# Patient Record
Sex: FEMALE | Race: WHITE | Employment: FULL TIME | URBAN - METROPOLITAN AREA
[De-identification: names, ages, dates, MRNs, and addresses within clinical notes are randomized per-mention and may not be internally consistent; named-entity substitution may affect disease eponyms.]

---

## 2017-09-21 ENCOUNTER — OFFICE VISIT (OUTPATIENT)
Dept: FAMILY MEDICINE CLINIC | Age: 67
End: 2017-09-21

## 2017-09-21 VITALS
OXYGEN SATURATION: 96 % | TEMPERATURE: 98.4 F | HEART RATE: 85 BPM | DIASTOLIC BLOOD PRESSURE: 84 MMHG | BODY MASS INDEX: 41.95 KG/M2 | HEIGHT: 70 IN | RESPIRATION RATE: 16 BRPM | SYSTOLIC BLOOD PRESSURE: 153 MMHG | WEIGHT: 293 LBS

## 2017-09-21 DIAGNOSIS — Z12.31 ENCOUNTER FOR SCREENING MAMMOGRAM FOR BREAST CANCER: Primary | ICD-10-CM

## 2017-09-21 DIAGNOSIS — Z12.11 COLON CANCER SCREENING: ICD-10-CM

## 2017-09-21 DIAGNOSIS — M79.661 RIGHT CALF PAIN: ICD-10-CM

## 2017-09-21 RX ORDER — MELOXICAM 7.5 MG/1
7.5 TABLET ORAL DAILY
Qty: 30 TAB | Refills: 0 | Status: SHIPPED | OUTPATIENT
Start: 2017-09-21 | End: 2017-10-06 | Stop reason: SDUPTHER

## 2017-09-21 NOTE — PROGRESS NOTES
Subjective:     Oniel Melo is a 74 y.o. female who presents today with the following:  Chief Complaint   Patient presents with    Leg Pain     Patient c/o R leg pain which started about 6 weeks ago. Last night patient was walking up the steps and tripped. At that time it she felt that R calf muscle \"tore\" and pain radiated into knee. Patient can hardly bear weight on that leg. Has been placing ice on affected area and took Aleve, which is helping. Patient cannot recall original injury, states it started just out of the blue. Denies swelling, redness, or warmth. Describes pain as a constant rolando horse cramp. Is wondering if she needs an MRI; called her insurance company and they told her she would be covered by insurance. ROS:  Gen: denies fever, chills, fatigue, weight loss, weight gain  HEENT:denies blurry vision, nasal congestion, sore throat  Resp: denies dypsnea, cough, wheezing  CV: denies chest pain, palpitations, lower extremity edema  Abd: denies nausea, vomiting, diarrhea, constipation  Neuro: denies numbness/tingling  Endo: denies polyuria, polydipsia, heat/cold intolerance  Heme: no lymphadenopathy    No Known Allergies      Current Outpatient Prescriptions:     meloxicam (MOBIC) 7.5 mg tablet, Take 1 Tab by mouth daily. , Disp: 30 Tab, Rfl: 0    clobetasol (TEMOVATE) 0.05 % topical cream, Apply  to affected area two (2) times a day. hand, Disp: 15 g, Rfl: 1    PV W-O ALBERTO/FERROUS FUMARATE/FA (M-VIT PO), Take  by mouth., Disp: , Rfl:     Past Medical History:   Diagnosis Date    Arthritis     Eczema     Hyperlipidemia     Hypothyroid        Past Surgical History:   Procedure Laterality Date    HX GYN      tubal pregnancy    HX ORTHOPAEDIC  1998    bone spur       History   Smoking Status    Current Every Day Smoker    Packs/day: 1.00    Years: 30.00    Types: Cigarettes   Smokeless Tobacco    Never Used       Social History     Social History    Marital status:  Spouse name: N/A    Number of children: N/A    Years of education: N/A     Social History Main Topics    Smoking status: Current Every Day Smoker     Packs/day: 1.00     Years: 30.00     Types: Cigarettes    Smokeless tobacco: Never Used    Alcohol use No    Drug use: No    Sexual activity: Yes     Partners: Male     Other Topics Concern    None     Social History Narrative       History reviewed. No pertinent family history. Objective:     Visit Vitals    /84 (BP 1 Location: Left arm, BP Patient Position: Sitting)    Pulse 85    Temp 98.4 °F (36.9 °C) (Oral)    Resp 16    Ht 5' 10\" (1.778 m)    Wt 320 lb (145.2 kg)    SpO2 96%    BMI 45.92 kg/m2     Gen: alert, oriented, no acute distress  Head: normocephalic, atraumatic  Eyes:sclera clear, conjunctiva clear  Oral: moist mucus membranes  Resp: Normal work of breathing, lungs CTAB, no w/r/r  CV: S1, S2 normal.  No murmurs, rubs, or gallops. Extremities: Bilateral legs with moderate amount of swelling in knees. R knee non tender to palpation. R calf with negative homans sign. Normal strength LE. Normal gait. No results found for this visit on 09/21/17. Assessment/ Plan:   Diagnoses and all orders for this visit:    1. Encounter for screening mammogram for breast cancer  -     [facility] MAMMO BI SCREENING INCL CAD; Future    2. Colon cancer screening  -     OCCULT BLOOD, IMMUNOASSAY (FIT)    3. Right calf pain  -     meloxicam (MOBIC) 7.5 mg tablet; Take 1 Tab by mouth daily. Exam WNL today; likely muscular strain. Has some swelling in knees, this is likely chronic given bilateral nature. PT agreed to start with ice and NSAIDs. Will call if symptoms persist and she wants to pursue imaging of her knee. Of note, patient also requested an rx of Augmentin \"just to have\". States she takes \"1 pill at a time\" when she has a sore throat and that helps keep her from developing bronchitis.   I explained that this is not how antibiotics are taken and her method could help promote antibiotic resistance. I declined to fill Rx. Verbal and written instructions (see AVS) provided.  Patient expresses understanding of diagnosis and treatment plan. Sandeep Dose.  Denver Sarmiento MD

## 2017-09-21 NOTE — PROGRESS NOTES
Chief Complaint   Patient presents with    Leg Pain     Patient states she pulled muscle in leg yesterday. Patient declined Influenza Vaccine today.

## 2017-10-05 LAB — HEMOCCULT STL QL IA: NEGATIVE

## 2017-10-06 ENCOUNTER — DOCUMENTATION ONLY (OUTPATIENT)
Dept: INTERNAL MEDICINE CLINIC | Age: 67
End: 2017-10-06

## 2017-10-06 DIAGNOSIS — M79.661 RIGHT CALF PAIN: ICD-10-CM

## 2017-10-06 RX ORDER — MELOXICAM 7.5 MG/1
7.5 TABLET ORAL DAILY
Qty: 30 TAB | Refills: 0 | Status: SHIPPED | OUTPATIENT
Start: 2017-10-06 | End: 2018-09-11 | Stop reason: ALTCHOICE

## 2017-10-06 NOTE — PROGRESS NOTES
Patient called requesting refill of mobic. Dr. Karyle Barba called prescription today. Patient states is not able to get it until 10/14. Patient stated she doubled the medication because 1 tab did not work for her. Advised patient that I could  Not call her pharmacy to increase her dosage as  she ran out of medication because took 2tbs daily instead of 1 as prescribed.

## 2018-09-11 ENCOUNTER — OFFICE VISIT (OUTPATIENT)
Dept: FAMILY MEDICINE CLINIC | Age: 68
End: 2018-09-11

## 2018-09-11 VITALS
SYSTOLIC BLOOD PRESSURE: 150 MMHG | HEART RATE: 77 BPM | TEMPERATURE: 97.6 F | BODY MASS INDEX: 43.4 KG/M2 | DIASTOLIC BLOOD PRESSURE: 86 MMHG | WEIGHT: 293 LBS | OXYGEN SATURATION: 96 % | HEIGHT: 69 IN | RESPIRATION RATE: 20 BRPM

## 2018-09-11 DIAGNOSIS — L30.9 PERIORBITAL DERMATITIS: ICD-10-CM

## 2018-09-11 DIAGNOSIS — L30.9 HAND ECZEMA: ICD-10-CM

## 2018-09-11 DIAGNOSIS — H02.846 SWELLING OF LEFT EYELID: Primary | ICD-10-CM

## 2018-09-11 RX ORDER — DICLOFENAC POTASSIUM 50 MG/1
50 TABLET, FILM COATED ORAL 3 TIMES DAILY
COMMUNITY
End: 2021-11-22 | Stop reason: SDUPTHER

## 2018-09-11 RX ORDER — AMOXICILLIN AND CLAVULANATE POTASSIUM 875; 125 MG/1; MG/1
1 TABLET, FILM COATED ORAL 2 TIMES DAILY
Qty: 20 TAB | Refills: 1 | Status: SHIPPED | OUTPATIENT
Start: 2018-09-11 | End: 2018-09-21

## 2018-09-11 RX ORDER — ERYTHROMYCIN 5 MG/G
OINTMENT OPHTHALMIC
Qty: 3.5 G | Refills: 0 | Status: SHIPPED | OUTPATIENT
Start: 2018-09-11 | End: 2020-07-10

## 2018-09-11 RX ORDER — CLOBETASOL PROPIONATE 0.5 MG/G
OINTMENT TOPICAL 2 TIMES DAILY
Qty: 15 G | Refills: 0 | Status: SHIPPED | OUTPATIENT
Start: 2018-09-11 | End: 2018-12-19 | Stop reason: SDUPTHER

## 2018-09-11 NOTE — PROGRESS NOTES
Chief Complaint Patient presents with  Eye Swelling  
  left 1. Have you been to the ER, urgent care clinic since your last visit? Hospitalized since your last visit? No 
 
2. Have you seen or consulted any other health care providers outside of the [address] [place] [address] since your last visit? Include any pap smears or colon screening. No  
 
Pt reports that the above symptoms started around a 1.5 weeks ago. Pt reports that she put Vaseline on her eye lid, and then the next day it was swollen. Pt declined flu shot today.

## 2018-09-11 NOTE — MR AVS SNAPSHOT
[facility] 
 
 
 [address] [place] [address] 
[phone] Patient: Benedict Patel MRN: [id number] :1950 Visit Information Date & Time Provider Department Dept. Phone Encounter #  
 2018  7:45 AM Sapphire Sherman NP [address] [place] [address] [phone] [id number] Upcoming Health Maintenance Date Due Hepatitis C Screening 1950 BREAST CANCER SCRN MAMMOGRAM 2002 ZOSTER VACCINE AGE 60> 2012 GLAUCOMA SCREENING Q2Y 2017 Bone Densitometry (Dexa) Screening 2017 Influenza Age 5 to Adult 2018 FOBT Q 1 YEAR AGE 50-75 2018 Pneumococcal 65+ Low/Medium Risk (2 of 2 - PPSV23) 2018 DTaP/Tdap/Td series (2 - Td) 2024 Allergies as of 2018  Review Complete On: 2018 By: Sapphire Sherman NP No Known Allergies Current Immunizations  Never Reviewed Name Date Tdap 2014 11:46 AM  
  
 Not reviewed this visit You Were Diagnosed With   
  
 Codes Comments Swelling of left eyelid    -  Primary ICD-10-CM: U60.092 ICD-9-CM: 374.82 Periorbital dermatitis     ICD-10-CM: L30.9 ICD-9-CM: 692.9 Hand eczema     ICD-10-CM: L30.9 ICD-9-CM: 692.9 Vitals BP Pulse Temp Resp Height(growth percentile) Weight(growth percentile) 150/86 (BP 1 Location: Left arm, BP Patient Position: Sitting) 77 97.6 °F (36.4 °C) (Oral) 20 5' 9\" (1.753 m) 332 lb 12.8 oz (151 kg) SpO2 BMI OB Status Smoking Status 96% 49.15 kg/m2 Postmenopausal Current Every Day Smoker Vitals History BMI and BSA Data Body Mass Index Body Surface Area  
 49.15 kg/m 2 2.71 m 2 Preferred Pharmacy [facility] [phone] Your Updated Medication List  
  
   
This list is accurate as of 18  9:26 AM.  Always use your most recent med list.  
  
  
  
  
 amoxicillin-clavulanate 875-125 mg per tablet Commonly known as:  AUGMENTIN Take 1 Tab by mouth two (2) times a day for 10 days. clobetasol 0.05 % ointment Commonly known as:  Becky Mood Apply  to affected area two (2) times a day. diclofenac potassium 50 mg tablet Commonly known as:  CATAFLAM  
Take 50 mg by mouth three (3) times daily. erythromycin ophthalmic ointment Commonly known as:  ILOTYCIN Apply to eyelid and under eye 2 times per day Prescriptions Sent to Pharmacy Refills  
 erythromycin (ILOTYCIN) ophthalmic ointment 0 Sig: Apply to eyelid and under eye 2 times per day Class: Normal  
 Pharmacy: [address] [place] [address] Ph #: [phone]  
 amoxicillin-clavulanate (AUGMENTIN) 875-125 mg per tablet 1 Sig: Take 1 Tab by mouth two (2) times a day for 10 days. Class: Normal  
 Pharmacy: [address] Ph #: [phone] Route: Oral  
 clobetasol (TEMOVATE) 0.05 % ointment 0 Sig: Apply  to affected area two (2) times a day. Class: Normal  
 Pharmacy: [address] Ph #: [phone] Route: Topical  
  
Patient Instructions Atopic Dermatitis: Care Instructions Your Care Instructions Atopic dermatitis (also called eczema) is a skin problem that causes intense itching and a red, raised rash. In severe cases, the rash develops clear fluid-filled blisters. The rash is not contagious. People with this condition seem to have very sensitive immune systems that are likely to react to things that cause allergies. The immune system is the body's way of fighting infection. There is no cure for atopic dermatitis, but you may be able to control it with care at home. Follow-up care is a key part of your treatment and safety.  Be sure to make and go to all appointments, and call your doctor if you are having problems. It's also a good idea to know your test results and keep a list of the medicines you take. How can you care for yourself at home? · Use moisturizer at least twice a day. · If your doctor prescribes a cream, use it as directed. If your doctor prescribes other medicine, take it exactly as directed. · Wash the affected area with water only. Soap can make dryness and itching worse. Pat dry. · Apply a moisturizer after bathing. Use a cream such as Lubriderm, Moisturel, or Cetaphil that does not irritate the skin or cause a rash. Apply the cream while your skin is still damp after lightly drying with a towel. · Use cold, wet cloths to reduce itching. · Keep cool, and stay out of the sun. · If itching affects your normal activities, an over-the-counter antihistamine, such as diphenhydramine (Benadryl) or loratadine (Claritin) may help. Read and follow all instructions on the label. When should you call for help? Call your doctor now or seek immediate medical care if: 
  · Your rash gets worse and you have a fever.  
  · You have new blisters or bruises, or the rash spreads and looks like a sunburn.  
  · You have signs of infection, such as: 
¨ Increased pain, swelling, warmth, or redness. ¨ Red streaks leading from the rash. ¨ Pus draining from the rash. ¨ A fever.  
  · You have crusting or oozing sores.  
  · You have joint aches or body aches along with your rash.  
 Watch closely for changes in your health, and be sure to contact your doctor if: 
  · Your rash does not clear up after 2 to 3 weeks of home treatment.  
  · Itching interferes with your sleep or daily activities. Where can you learn more? Go to http://ladan-jarrod.info/. Enter Z012 in the search box to learn more about \"Atopic Dermatitis: Care Instructions. \" Current as of: October 5, 2017 Content Version: 11.7 © 7279-2371 Healthwise, Incorporated. Care instructions adapted under license by [employer] (which disclaims liability or warranty for this information). If you have questions about a medical condition or this instruction, always ask your healthcare professional. [address] any warranty or liability for your use of this information. Introducing [facility]! [employer] introduces Fly[employer]ivity patient portal. Now you can access parts of your medical record, email your doctor's office, and request medication refills online. 1. In your internet browser, go to [email or web address]. [employer] 2. Click on the First Time User? Click Here link in the Sign In box. You will see the New Member Sign Up page. 3. Enter your Flytivity Access Code exactly as it appears below. You will not need to use this code after youve completed the sign-up process. If you do not sign up before the expiration date, you must request a new code. · Fly[employer] Access Code: [email or web address] Expires: 12/10/2018  9:26 AM 
 
4. Enter the last four digits of your Social Security Number (xxxx) and Date of Birth (mm/dd/yyyy) as indicated and click Submit. You will be taken to the next sign-up page. 5. Create a Flytivity ID. This will be your Flytivity login ID and cannot be changed, so think of one that is secure and easy to remember. 6. Create a [employer]ivity password. You can change your password at any time. 7. Enter your Password Reset Question and Answer. This can be used at a later time if you forget your password. 8. Enter your e-mail address. You will receive e-mail notification when new information is available in [address] 9. Click Sign Up. You can now view and download portions of your medical record. 10. Click the Download Summary menu link to download a portable copy of your medical information.  
 
If you have questions, please visit the Frequently Asked Questions section of the [email or web address]. Remember, [facility] is NOT to be used for urgent needs. For medical emergencies, dial 911. Now available from your iPhone and Android! Please provide this summary of care documentation to your next provider. Your primary care clinician is listed as Anu Philip. If you have any questions after today's visit, please call [phone].

## 2018-09-11 NOTE — PATIENT INSTRUCTIONS

## 2018-09-11 NOTE — PROGRESS NOTES
[facility] is a 79 y.o. female  and presents with Chief Complaint Patient presents with  Eye Swelling  
  left Left eye swelling for about a week and half. She denies any insect bite, trauma, foreign body, exposures to any chemicals, changes in her environment, new food, no makeup,soaps or lotions (she does not use makeup). She does not recall rubbing her eye. She says that she started with \"dry patch to upper left eye lid\", put vaseline on it and then next day when she woke up the whole left eye lid and eye was swollen and has progressively worsened and now has redness to the skin below the eye as well. She says that yesterday the eye appeared worse than today. She has tried using eye drops \"moisture drop\" to flush the eye. She denies any purulent drainage. She denies any itching. She denies any pain. She denies any change in her vision. She does not wear contacts. She does not have sensation of FB. She does have a history of eczema, usually only affects her right palm of her hand. Past Medical History:  
Diagnosis Date  Arthritis  Eczema  Hyperlipidemia  Hypothyroid Past Surgical History:  
Procedure Laterality Date  HX GYN    
 tubal pregnancy  HX ORTHOPAEDIC  1998  
 bone spur Social History Social History  Marital status:  Spouse name: N/A  
 Number of children: N/A  
 Years of education: N/A Occupational History  Not on file. Social History Main Topics  Smoking status: Current Every Day Smoker Packs/day: 1.00 Years: 30.00 Types: Cigarettes  Smokeless tobacco: Never Used  Alcohol use No  
 Drug use: No  
 Sexual activity: Yes  
  Partners: Male Other Topics Concern  Not on file Social History Narrative No Known Allergies ROS: 
Gen: no fatigue, fever, chills Eyes: no excessive tearing, itching, or discharge Nose: no rhinorrhea, no sinus pain Mouth: no oral lesions, no sore throat Resp: no shortness of breath, no wheezing, no cough CV: no chest pain, no paroxysmal nocturnal dyspnea Abd: no nausea, no heartburn, no diarrhea, no constipation, no abdominal pain Neuro: no headaches, no syncope or presyncopal episodes Heme: no lymphadenopathy, no easy bruising or bleeding Physical Examination:   
Physical Exam 
Visit Vitals  /86 (BP 1 Location: Left arm, BP Patient Position: Sitting)  Pulse 77  Temp 97.6 °F (36.4 °C) (Oral)  Resp 20  
 Ht 5' 9\" (1.753 m)  Wt 332 lb 12.8 oz (151 kg)  SpO2 96%  BMI 49.15 kg/m2 Gen: alert, oriented, no acute distress Head: normocephalic, atraumatic Ears: external auditory canals clear, TMs without erythema or effusion Eyes: RIGHT EYE: pupils equal round reactive to light, sclera clear, conjunctiva clear. No swelling or periorbital erythema. LEFT eye: Pupils are equal round reactive to light, sclera clear, conjunctive are clear, no signs of foreign body to the upper or lower lid, no sign of trauma, no unusual drainage or discharge. The left upper lid and skin around the entire anna-orbital area appears dry, flaky, erythematous and the upper lid appears slightly edematous. There are no signs of open wounds or insect bite. Visual acuity is intact Nose: normal turbinates, no rhinorrhea Resp: no increased work of breathing Neuro: cranial nerves intact, normal strength and movement in all extremities Skin: The palm of right hand has a erythematous scaly dermatitis. Assessment/Plan: 
Differential diagnosis and treatment options reviewed with patient who is in agreement with treatment plan as outlined below. ICD-10-CM ICD-9-CM 1. Swelling of left eyelid H02.846 374.82 erythromycin (ILOTYCIN) ophthalmic ointment 2. Periorbital dermatitis L30.9 692.9 erythromycin (ILOTYCIN) ophthalmic ointment 3. Hand eczema L30.9 692.9 clobetasol (TEMOVATE) 0.05 % ointment Questionable infection versus allergic dermatitis versus contact dermatitis versus eczema flare. Will try treating the eye area with erythromycin eye ointment, she will call to let me know if there is no improvement or any worsening of her symptoms. She also requests a refill on Augmentin, states that Dr. Helen Krause has given her standing order for Augmentin in the past.  May not be a bad idea for her to start the Augmentin if her symptoms worsen. She is overdue for a wellness physical, I recommend that she schedule follow-up for routine labs and wellness appointment. She declines flu shot at this time. She declines health maintenance screening such as mammogram and DEXA bone density screening. She will be due for colon cancer screening FIT test in a few weeks as well She is overdue for eye exam, she has had Lasix surgery in the past.  May not be a bad idea for her to visit her eye doctor for routine glaucoma screening and eye exam and let them evaluate her eye as well. Microphone off Her blood pressure is elevated today. I encouraged her to follow a DASH diet at home, and blood pressure monitoring at home as well. I recommend that she follow-up in 2-3 weeks for a blood pressure recheck. Discussed BMI and healthy weight. Encouraged patient to work to implement changes including diet high in raw fruits and vegetables, lean protein and good fats. Limit refined, processed carbohydrates and sugar. Encouraged regular exercise. Verbal and written instructions (see AVS) provided. Patient expresses understanding and agreement of diagnosis and treatment plan.

## 2018-12-19 DIAGNOSIS — L30.9 HAND ECZEMA: ICD-10-CM

## 2018-12-20 NOTE — TELEPHONE ENCOUNTER
I do not understand this request.  Please call patient and get more information (requesting refill on Augmentin?   When was this prescribed?)

## 2018-12-27 NOTE — TELEPHONE ENCOUNTER
Called pt and verified name and . Pt verbalized that she wanted a rx for the chronic cough she has. Informed her that she hasn't been seen since September and that she would need to be seen and evaluated to have something prescribed for her. Pt verbalized that she is unable to make appointment to come into office to be seen due to her work schedule and she can not get anymore time off. Advised pt that she can go to  or [facility] express that is open after 5 PM to see if she can be evaluated. Pt verbalized understanding. Pt would still like a refill on cream. Please advise.

## 2019-01-02 RX ORDER — CLOBETASOL PROPIONATE 0.5 MG/G
OINTMENT TOPICAL 2 TIMES DAILY
Qty: 15 G | Refills: 0 | Status: SHIPPED | OUTPATIENT
Start: 2019-01-02 | End: 2019-05-03 | Stop reason: SDUPTHER

## 2019-01-02 RX ORDER — AMOXICILLIN AND CLAVULANATE POTASSIUM 875; 125 MG/1; MG/1
TABLET, FILM COATED ORAL
Qty: 20 TAB | Refills: 1 | OUTPATIENT
Start: 2019-01-02

## 2019-05-03 DIAGNOSIS — L30.9 HAND ECZEMA: ICD-10-CM

## 2019-05-03 RX ORDER — CLOBETASOL PROPIONATE 0.5 MG/G
OINTMENT TOPICAL 2 TIMES DAILY
Qty: 15 G | Refills: 0 | Status: SHIPPED | OUTPATIENT
Start: 2019-05-03 | End: 2019-05-16 | Stop reason: SDUPTHER

## 2019-05-16 ENCOUNTER — OFFICE VISIT (OUTPATIENT)
Dept: FAMILY MEDICINE CLINIC | Age: 69
End: 2019-05-16

## 2019-05-16 VITALS
TEMPERATURE: 97.9 F | BODY MASS INDEX: 43.4 KG/M2 | WEIGHT: 293 LBS | SYSTOLIC BLOOD PRESSURE: 151 MMHG | HEART RATE: 79 BPM | HEIGHT: 69 IN | DIASTOLIC BLOOD PRESSURE: 99 MMHG | OXYGEN SATURATION: 95 % | RESPIRATION RATE: 14 BRPM

## 2019-05-16 DIAGNOSIS — L98.491 SKIN ULCER, LIMITED TO BREAKDOWN OF SKIN (HCC): ICD-10-CM

## 2019-05-16 DIAGNOSIS — E55.9 VITAMIN D DEFICIENCY: ICD-10-CM

## 2019-05-16 DIAGNOSIS — E03.9 ACQUIRED HYPOTHYROIDISM: ICD-10-CM

## 2019-05-16 DIAGNOSIS — E78.2 MIXED HYPERLIPIDEMIA: ICD-10-CM

## 2019-05-16 DIAGNOSIS — Z11.59 ENCOUNTER FOR HEPATITIS C SCREENING TEST FOR LOW RISK PATIENT: ICD-10-CM

## 2019-05-16 DIAGNOSIS — N39.3 STRESS INCONTINENCE OF URINE: ICD-10-CM

## 2019-05-16 DIAGNOSIS — Z12.11 SPECIAL SCREENING FOR MALIGNANT NEOPLASMS, COLON: ICD-10-CM

## 2019-05-16 DIAGNOSIS — E66.01 OBESITY, CLASS III, BMI 40-49.9 (MORBID OBESITY) (HCC): ICD-10-CM

## 2019-05-16 DIAGNOSIS — E66.01 CLASS 3 SEVERE OBESITY WITH BODY MASS INDEX (BMI) OF 50.0 TO 59.9 IN ADULT, UNSPECIFIED OBESITY TYPE, UNSPECIFIED WHETHER SERIOUS COMORBIDITY PRESENT (HCC): ICD-10-CM

## 2019-05-16 DIAGNOSIS — R60.0 LOWER EXTREMITY EDEMA: ICD-10-CM

## 2019-05-16 DIAGNOSIS — L30.9 ECZEMA, UNSPECIFIED TYPE: Primary | ICD-10-CM

## 2019-05-16 DIAGNOSIS — L30.9 HAND ECZEMA: ICD-10-CM

## 2019-05-16 DIAGNOSIS — I10 ESSENTIAL HYPERTENSION: ICD-10-CM

## 2019-05-16 RX ORDER — HYDROCHLOROTHIAZIDE 12.5 MG/1
12.5 TABLET ORAL DAILY
Qty: 30 TAB | Refills: 1 | Status: SHIPPED | OUTPATIENT
Start: 2019-05-16 | End: 2019-06-17 | Stop reason: SDUPTHER

## 2019-05-16 RX ORDER — CLOBETASOL PROPIONATE 0.5 MG/G
OINTMENT TOPICAL 2 TIMES DAILY
Qty: 60 G | Refills: 3 | Status: SHIPPED | OUTPATIENT
Start: 2019-05-16

## 2019-05-16 RX ORDER — MUPIROCIN 20 MG/G
OINTMENT TOPICAL 2 TIMES DAILY
Qty: 22 G | Refills: 0 | Status: SHIPPED | OUTPATIENT
Start: 2019-05-16 | End: 2019-07-22

## 2019-05-16 RX ORDER — AMOXICILLIN AND CLAVULANATE POTASSIUM 875; 125 MG/1; MG/1
1 TABLET, FILM COATED ORAL 2 TIMES DAILY
Qty: 20 TAB | Refills: 0 | Status: SHIPPED | OUTPATIENT
Start: 2019-05-16 | End: 2019-05-26

## 2019-05-16 NOTE — PROGRESS NOTES
Subjective: Chief Complaint Patient presents with  
 Skin Problem  
  eczema HPI: 
Ricardo Poe is a 81 y.o. female presents for follow up appointment. Today she is here for complaints of eczema flaring up about a month ago. Has a small open wound to right lower leg. Has been applying vaseline and/or clobetasol ointment which helps with itching but is not clearing it up. She has not been applying antibiotic ointment to the open wound. Has not ever been to dermatology for this complaint. BP elevated today but says she is stressed from her job. She says that she checks her BP at home and is always good. She has some swelling in her lower legs, thinks is related to her eczema flaring up. Has history of hypothyroidism, says she has been off medication for at least 4 years \"because I dont think it was doing anything\". Has not had labs done in very long time. Denies polyuria or polydipsia. She admits that she has stress incontinence. She is a smoker, about half pack per day. She does not want to quit. No hospital, ER or specialist visits since last primary care visit except as noted above. Past Medical History:  
Diagnosis Date  Arthritis  Eczema  Hyperlipidemia  Hypothyroid Social History Tobacco Use  Smoking status: Current Every Day Smoker Packs/day: 1.00 Years: 30.00 Pack years: 30.00 Types: Cigarettes  Smokeless tobacco: Never Used Substance Use Topics  Alcohol use: No  
  Alcohol/week: 0.0 oz  Drug use: No  
 
 
Outpatient Medications Marked as Taking for the 5/16/19 encounter (Office Visit) with Reema Lopez, DOUG Medication Sig Dispense Refill  clobetasol (TEMOVATE) 0.05 % ointment APPLY TO AFFECTED AREA TWO (2) TIMES A DAY. 15 g 0  
 diclofenac potassium (CATAFLAM) 50 mg tablet Take 50 mg by mouth three (3) times daily.     
 erythromycin (ILOTYCIN) ophthalmic ointment Apply to eyelid and under eye 2 times per day 3.5 g 0 No Known Allergies Health Maintenance reviewed ROS: 
Gen: no fatigue, no fever, no chills, no unexplained weight loss or weight gain Eyes: no excessive tearing, itching, or discharge Nose: no rhinorrhea, no sinus pain Mouth: no oral lesions, no sore throat, no difficulty swallowing Resp: no shortness of breath, no wheezing, no cough CV: no chest pain, no orthopnea, no paroxysmal nocturnal dyspnea, no palpitations Abd: no nausea, no heartburn, no diarrhea, no constipation, no abdominal pain Neuro: no headaches, no syncope or presyncopal episodes Endo: no polyuria, no polydipsia. : no hematuria, no dysuria, no frequency, no incontinence Heme: no lymphadenopathy, no easy bruising or bleeding, no night sweats PE: 
Visit Vitals BP (!) 151/99 (BP 1 Location: Left arm, BP Patient Position: Sitting) Pulse 79 Temp 97.9 °F (36.6 °C) (Oral) Resp 14 Ht 5' 9\" (1.753 m) Wt 346 lb (156.9 kg) SpO2 95% BMI 51.10 kg/m² Gen: alert, oriented, no acute distress Head: normocephalic, atraumatic Ears: external auditory canals clear, TMs without erythema or effusion Eyes: pupils equal round reactive to light, sclera clear, conjunctiva clear Oral: moist mucus membranes, no oral lesions, no pharyngeal inflammation or exudate Neck: symmetric normal sized thyroid, no carotid bruits, no jugular vein distention Resp: no increase work of breathing, lungs clear to ausculation bilaterally, no wheezing, rales or rhonchi CV: S1, S2 normal.  No murmurs, rubs, or gallops. Abd: soft, not tender, not distended. No hepatosplenomegaly. Normal bowel sounds. No abdominal or renal bruits. Neuro: cranial nerves intact, normal strength and movement in all extremities, reflexes and sensation intact and symmetric. Skin: dime size dermal ulcer to her lower right shin with some mild surrounding erythema.   She has scattered erythematous, dry/scaly patches to lower extremities. Extremities: no cyanosis. Trace bilateral lower extremities. Assessment/Plan: 
Differential diagnosis and treatment options reviewed with patient who is in agreement with treatment plan as outlined below. ICD-10-CM ICD-9-CM 1. Eczema, unspecified type L30.9 692.9 CBC WITH AUTOMATED DIFF 2. Acquired hypothyroidism E03.9 244.9 TSH 3RD GENERATION  
   T3 TOTAL T4, FREE 3. Mixed hyperlipidemia L67.1 731.7 METABOLIC PANEL, COMPREHENSIVE  
   LIPID PANEL  
4. Obesity, Class III, BMI 40-49.9 (morbid obesity) ([facility]) E66.01 278.01   
5. Class 3 severe obesity with body mass index (BMI) of 50.0 to 59.9 in adult, unspecified obesity type, unspecified whether serious comorbidity present ([facility] E66.01 278.01   
 Z68.43 V85.43   
6. Vitamin D deficiency E55.9 268.9 VITAMIN D, 25 HYDROXY 7. Encounter for hepatitis C screening test for low risk patient Z11.59 V73.89 HEPATITIS C AB  
8. Hand eczema L30.9 692.9 clobetasol (TEMOVATE) 0.05 % ointment 9. Skin ulcer, limited to breakdown of skin ([facility]) L98.491 707.9 mupirocin (BACTROBAN) 2 % ointment 10. Essential hypertension I10 401.9 hydroCHLOROthiazide (HYDRODIURIL) 12.5 mg tablet 11. Lower extremity edema R60.0 782.3 hydroCHLOROthiazide (HYDRODIURIL) 12.5 mg tablet 12. Stress incontinence of urine N39.3 [id number] 13. Special screening for malignant neoplasms, colon Z12.11 V76.51 OCCULT BLOOD IMMUNOASSAY,DIAGNOSTIC  
 
BP elevated and has lower extremity edema. Will start on HCTZ. Medication profile discussed with patient. DASH diet discussed and encouraged. Stress incontinence: Will trial kegel exercises and bladder training. Advised to go to BR Q2 hours. Discussed BMI and healthy weight. Encouraged patient to work to implement changes including diet high in raw fruits and vegetables, lean protein and good fats. Limit refined, processed carbohydrates and sugar.  Encouraged regular exercise. Recommended regular cardiovascular exercise 3-6 times per week for 30-60 minutes daily. Labs today. Will see if thyroid lab is abnormal which could be contributing to her eczema flare Consider refer to derm if no improvement Treat dermal ulcer with topical Bactroban. She will call if no improvement or worsening of wound. Follow-up in 2 to 4 weeks for blood pressure recheck. I have discussed the diagnosis with the patient and the intended plan as seen in the above orders. The patient has received an after-visit summary and questions were answered concerning future plans. I have discussed medication side effects and warnings with the patient as well. The patient verbalizes understanding and agreement with the plan.

## 2019-05-16 NOTE — PROGRESS NOTES
. 
Chief Complaint Patient presents with  
 Skin Problem  
  eczema Zachery Vega 1. Have you been to the ER, urgent care clinic since your last visit? Hospitalized since your last visit? no 
 
2. Have you seen or consulted any other health care providers outside of the [address] [place] [address] since your last visit? Include any pap smears or colon screening. No 
. Health Maintenance Due Topic Date Due  
 Hepatitis C Screening  1950  Shingrix Vaccine Age 50> (1 of 2) 04/08/2002  BREAST CANCER SCRN MAMMOGRAM  04/08/2002  GLAUCOMA SCREENING Q2Y  04/08/2017  Bone Densitometry (Dexa) Screening  04/08/2017  Pneumococcal 65+ years (1 of 2 - PCV13) 04/08/2017  
 FOBT Q 1 YEAR AGE 50-75  09/27/2018 Silverio Leos Sebastian Spatz

## 2019-05-16 NOTE — PATIENT INSTRUCTIONS
Kegel Exercises: Care Instructions Your Care Instructions Kegel exercises strengthen muscles around the bladder. These muscles control the flow of urine. Kegel exercises are sometime called \"pelvic floor\" exercises. They can help prevent urine leakage and keep the pelvic organs in place. A woman who just had a baby might want to try Kegel exercises. They can strengthen pelvic muscles that have been weakened by pregnancy and childbirth. A man or woman may use Kegel exercises to treat urine leakage. You do Kegel exercises by tightening the muscles you use when you urinate. You will likely need to do these exercises for several weeks to get better. Follow-up care is a key part of your treatment and safety. Be sure to make and go to all appointments, and call your doctor if you are having problems. It's also a good idea to know your test results and keep a list of the medicines you take. How can you care for yourself at home? · Do Kegel exercises. ? Find the muscles you need to strengthen. To do this, tighten the muscles that stop your urine while you are going to the bathroom. These are the same muscles you squeeze during Kegel exercises. ? Squeeze the muscles as hard as you can. Your belly and thighs should not move. ? Hold the squeeze for 3 seconds. Then relax for 3 seconds. ? Start with 3 seconds, and then add 1 second each week until you are able to squeeze for 10 seconds. ? Repeat the exercise 10 to 15 times for each session. Do three or more sessions each day. · You can check to see if you are using the right muscles. Place a finger in your vagina and squeeze around it. You are doing them right when you feel pressure around your finger. Your doctor may also suggest that you put special weights in your vagina while you do the exercises. · Do not smoke. It can irritate the bladder. If you need help quitting, talk to your doctor about stop-smoking programs and medicines.  These can increase your chances of quitting for good. Where can you learn more? Go to http://ladan-jarrod.info/. Enter Q082 in the search box to learn more about \"Kegel Exercises: Care Instructions. \" Current as of: March 20, 2018 Content Version: 11.9 © 3686-3012 [employer]. Care instructions adapted under license by [employer] (which disclaims liability or warranty for this information). If you have questions about a medical condition or this instruction, always ask your healthcare professional. [address] any warranty or liability for your use of this information. Bladder Training: Care Instructions Your Care Instructions Bladder training is used to treat urge incontinence and stress incontinence. Urge incontinence means that the need to urinate comes on so fast that you can't get to a toilet in time. Stress incontinence means that you leak urine because of pressure on your bladder. For example, it may happen when you laugh, cough, or lift something heavy. Bladder training can increase how long you can wait before you have to urinate. It can also help your bladder hold more urine. And it can give you better control over the urge to urinate. It is important to remember that bladder training takes a few weeks to a few months to make a difference. You may not see results right away, but don't give up. Follow-up care is a key part of your treatment and safety. Be sure to make and go to all appointments, and call your doctor if you are having problems. It's also a good idea to know your test results and keep a list of the medicines you take. How can you care for yourself at home? Work with your doctor to come up with a bladder training program that is right for you. You may use one or more of the following methods. Delayed urination · In the beginning, try to keep from urinating for 5 minutes after you first feel the need to go. · While you wait, take deep, slow breaths to relax. Kegel exercises can also help you delay the need to go to the bathroom. · After some practice, when you can easily wait 5 minutes to urinate, try to wait 10 minutes before you urinate. · Slowly increase the waiting period until you are able to control when you have to urinate. Scheduled urination · Empty your bladder when you first wake up in the morning. · Schedule times throughout the day when you will urinate. · Start by going to the bathroom every hour, even if you don't need to go. · Slowly increase the time between trips to the bathroom. · When you have found a schedule that works well for you, keep doing it. · If you wake up during the night and have to urinate, do it. Apply your schedule to waking hours only. Kegel exercises These tighten and strengthen pelvic muscles, which can help you control the flow of urine. To do Kegel exercises: 
· Squeeze the same muscles you would use to stop your urine. Your belly and thighs should not move. · Hold the squeeze for 3 seconds, and then relax for 3 seconds. · Start with 3 seconds. Then add 1 second each week until you are able to squeeze for 10 seconds. · Repeat the exercise 10 to 15 times a session. Do three or more sessions a day. When should you call for help? Watch closely for changes in your health, and be sure to contact your doctor if: 
  · Your incontinence is getting worse.  
  · You do not get better as expected. Where can you learn more? Go to http://ladan-jarrod.info/. Enter L752 in the search box to learn more about \"Bladder Training: Care Instructions. \" Current as of: March 20, 2018 Content Version: 11.9 © 6482-1389 [employer]. Care instructions adapted under license by [employer] (which disclaims liability or warranty for this information).  If you have questions about a medical condition or this instruction, always ask your healthcare professional. [place] [address] any warranty or liability for your use of this information.

## 2019-05-18 LAB
25(OH)D3+25(OH)D2 SERPL-MCNC: 14.1 NG/ML (ref 30–100)
ALBUMIN SERPL-MCNC: 4.3 G/DL (ref 3.6–4.8)
ALBUMIN/GLOB SERPL: 2 {RATIO} (ref 1.2–2.2)
ALP SERPL-CCNC: 53 IU/L (ref 39–117)
ALT SERPL-CCNC: 12 IU/L (ref 0–32)
AST SERPL-CCNC: 19 IU/L (ref 0–40)
BASOPHILS # BLD AUTO: 0 X10E3/UL (ref 0–0.2)
BASOPHILS NFR BLD AUTO: 0 %
BILIRUB SERPL-MCNC: 0.4 MG/DL (ref 0–1.2)
BUN SERPL-MCNC: 13 MG/DL (ref 8–27)
BUN/CREAT SERPL: 19 (ref 12–28)
CALCIUM SERPL-MCNC: 9 MG/DL (ref 8.7–10.3)
CHLORIDE SERPL-SCNC: 108 MMOL/L (ref 96–106)
CHOLEST SERPL-MCNC: 210 MG/DL (ref 100–199)
CO2 SERPL-SCNC: 17 MMOL/L (ref 20–29)
CREAT SERPL-MCNC: 0.68 MG/DL (ref 0.57–1)
EOSINOPHIL # BLD AUTO: 0.2 X10E3/UL (ref 0–0.4)
EOSINOPHIL NFR BLD AUTO: 3 %
ERYTHROCYTE [DISTWIDTH] IN BLOOD BY AUTOMATED COUNT: 13.8 % (ref 12.3–15.4)
GLOBULIN SER CALC-MCNC: 2.1 G/DL (ref 1.5–4.5)
GLUCOSE SERPL-MCNC: 93 MG/DL (ref 65–99)
HCT VFR BLD AUTO: 43.5 % (ref 34–46.6)
HCV AB S/CO SERPL IA: <0.1 S/CO RATIO (ref 0–0.9)
HDLC SERPL-MCNC: 51 MG/DL
HGB BLD-MCNC: 14.4 G/DL (ref 11.1–15.9)
IMM GRANULOCYTES # BLD AUTO: 0 X10E3/UL (ref 0–0.1)
IMM GRANULOCYTES NFR BLD AUTO: 1 %
INTERPRETATION, 910389: NORMAL
LDLC SERPL CALC-MCNC: 134 MG/DL (ref 0–99)
LYMPHOCYTES # BLD AUTO: 1 X10E3/UL (ref 0.7–3.1)
LYMPHOCYTES NFR BLD AUTO: 18 %
MCH RBC QN AUTO: 31.9 PG (ref 26.6–33)
MCHC RBC AUTO-ENTMCNC: 33.1 G/DL (ref 31.5–35.7)
MCV RBC AUTO: 97 FL (ref 79–97)
MONOCYTES # BLD AUTO: 0.3 X10E3/UL (ref 0.1–0.9)
MONOCYTES NFR BLD AUTO: 6 %
NEUTROPHILS # BLD AUTO: 4 X10E3/UL (ref 1.4–7)
NEUTROPHILS NFR BLD AUTO: 72 %
PLATELET # BLD AUTO: 141 X10E3/UL (ref 150–379)
POTASSIUM SERPL-SCNC: 4.9 MMOL/L (ref 3.5–5.2)
PROT SERPL-MCNC: 6.4 G/DL (ref 6–8.5)
RBC # BLD AUTO: 4.51 X10E6/UL (ref 3.77–5.28)
SODIUM SERPL-SCNC: 144 MMOL/L (ref 134–144)
T3 SERPL-MCNC: 115 NG/DL (ref 71–180)
T4 FREE SERPL-MCNC: 1.04 NG/DL (ref 0.82–1.77)
TRIGL SERPL-MCNC: 123 MG/DL (ref 0–149)
TSH SERPL DL<=0.005 MIU/L-ACNC: 4.27 UIU/ML (ref 0.45–4.5)
VLDLC SERPL CALC-MCNC: 25 MG/DL (ref 5–40)
WBC # BLD AUTO: 5.6 X10E3/UL (ref 3.4–10.8)

## 2019-05-21 DIAGNOSIS — E55.9 VITAMIN D DEFICIENCY: Primary | ICD-10-CM

## 2019-05-21 RX ORDER — ERGOCALCIFEROL 1.25 MG/1
50000 CAPSULE ORAL
Qty: 4 CAP | Refills: 3 | Status: SHIPPED | OUTPATIENT
Start: 2019-05-21 | End: 2019-07-22 | Stop reason: ALTCHOICE

## 2019-05-21 NOTE — PROGRESS NOTES
All results look good except her vitamin d is pretty low. She will need to be on a prescription high dose supplement that she will take once per week for 12 weeks. Vitamin D deficiency is very common and to replace the amount of vitamin D you need to be therapeutic, you will need a supplement (you will not be able to eat enough vitamin d to achieve this level). The symptoms of vitamin D deficiency are sometimes vague and can include tiredness and general aches and pains. Some people may not have any symptoms at all. Vitamin D also fights infections, including colds and the flu, as it regulates the expression of genes that influence your immune system to attack and destroy bacteria and viruses. When she completes high dose prescription after 12 weeks she will need to start taking a daily over the counter Vitamin d3 2000u per day. Thyroid lab normal limits. Cholesterol total slightly elevated at 210 but pretty much same as last check in our records 5 years ago. . Continue to work on diet and exercise. How is her legs doing? Did she want referral to derm?       Thanks  Hua Dailey NP

## 2019-05-23 NOTE — PROGRESS NOTES
verified. Patient notified of lab results per Keyla Parmar NP. Understanding verbalized. She denies derm referral and she states that her leg is doing better and the swelling has decreased.

## 2019-06-17 ENCOUNTER — OFFICE VISIT (OUTPATIENT)
Dept: FAMILY MEDICINE CLINIC | Age: 69
End: 2019-06-17

## 2019-06-17 VITALS
BODY MASS INDEX: 43.4 KG/M2 | TEMPERATURE: 98.3 F | HEART RATE: 88 BPM | DIASTOLIC BLOOD PRESSURE: 86 MMHG | RESPIRATION RATE: 18 BRPM | OXYGEN SATURATION: 95 % | HEIGHT: 69 IN | SYSTOLIC BLOOD PRESSURE: 138 MMHG | WEIGHT: 293 LBS

## 2019-06-17 DIAGNOSIS — R60.0 LOWER EXTREMITY EDEMA: ICD-10-CM

## 2019-06-17 DIAGNOSIS — L98.491 SKIN ULCER, LIMITED TO BREAKDOWN OF SKIN (HCC): ICD-10-CM

## 2019-06-17 DIAGNOSIS — I10 ESSENTIAL HYPERTENSION: Primary | ICD-10-CM

## 2019-06-17 RX ORDER — HYDROCHLOROTHIAZIDE 12.5 MG/1
12.5 TABLET ORAL DAILY
Qty: 30 TAB | Refills: 1 | Status: SHIPPED | OUTPATIENT
Start: 2019-06-17 | End: 2019-07-22 | Stop reason: SDUPTHER

## 2019-06-17 NOTE — PROGRESS NOTES
Identified pt with two pt identifiers(name and ). Reviewed record in preparation for visit and have obtained necessary documentation. Chief Complaint   Patient presents with    Medication Evaluation     1 month f/u     Blood Pressure Check      Visit Vitals  /86 (BP 1 Location: Left arm, BP Patient Position: Sitting)   Pulse 88   Temp 98.3 °F (36.8 °C) (Oral)   Resp 18   Ht 5' 9\" (1.753 m)   Wt 335 lb (152 kg)   SpO2 95%   BMI 49.47 kg/m²       Health Maintenance Due   Topic    Shingrix Vaccine Age 50> (1 of 2)    BREAST CANCER SCRN MAMMOGRAM     GLAUCOMA SCREENING Q2Y     Bone Densitometry (Dexa) Screening     Pneumococcal 65+ years (1 of 2 - PCV13)    FOBT Q 1 YEAR AGE 50-75        Coordination of Care Questionnaire:  :   1) Have you been to an emergency room, urgent care, or hospitalized since your last visit? If yes, where when, and reason for visit? no       2. Have seen or consulted any other health care provider since your last visit? If yes, where when, and reason for visit? NO      3) Do you have an Advanced Directive/ Living Will in place? NO  If yes, do we have a copy on file NO  If no, would you like information NO    Patient is accompanied by self I have received verbal consent from Sebas Frederick to discuss any/all medical information while they are present in the room.

## 2019-06-17 NOTE — PROGRESS NOTES
Subjective:      Chief Complaint   Patient presents with    Medication Evaluation     1 month f/u     Blood Pressure Check       Gita Kessler is a 81 y.o. female with history of hypertension, here for follow up. Hypertension ROS: not taking medications regularly as instructed, no medication side effects noted, home BP monitoring in range of 612'S'H systolic over 24'K'Y diastolic, no TIA's, no chest pain on exertion, no dyspnea on exertion, no swelling of ankles,headaches, shortness of breath, vision change. she not attempting to follow a low fat, low cholesterol diet, sedentary, smoker 1/2 pack per day. Has been eating more salads and lost 11 lbs in last month. Would not let nurse see her weight today when getting her vitals, apparently she covered the number on the scale and told the nurse that her weight was 335lb. Started on HCTZ last visit on 5/16/19 for elevated BP and swelling in legs. Was taking HCTZ consistently first 2 weeks and she says that her home BP readings were around 150/90. She hasn't been taking it for 3 days  because she \" forgot to.\". Notes that the HCTZ Helped a little bit first few days. /78 last Sunday. She states that she thinks her BP is lower when she doesn't take HCTZ but notes that her swelling in her legs is much better. Thinks that she may need a \"stronger\" diuretic. Right shin skin ulcer:  Completed round of antibiotics ( Augmentin)  at home. Been using Bactroban at home on right lower leg wound. Still a little red but not painful. She says \"looks much better\", usually has a scab on it but she just got out of shower prior to arrival and scab fell off. Has been swimming in her pool daily as well (salt water). No recent hospitalizations since last visit.     Past Medical History:   Diagnosis Date    Arthritis     Eczema     Hyperlipidemia     Hypothyroid      Past Surgical History:   Procedure Laterality Date    HX GYN      tubal pregnancy    HX ORTHOPAEDIC  1998    bone spur     Social History     Tobacco Use    Smoking status: Current Every Day Smoker     Packs/day: 1.00     Years: 30.00     Pack years: 30.00     Types: Cigarettes    Smokeless tobacco: Never Used   Substance Use Topics    Alcohol use: No     Alcohol/week: 0.0 oz     family history is not on file. Current Outpatient Medications on File Prior to Visit   Medication Sig    ergocalciferol (ERGOCALCIFEROL) 50,000 unit capsule Take 1 Cap by mouth every seven (7) days.  mupirocin (BACTROBAN) 2 % ointment Apply  to affected area two (2) times a day. For 2 weeks    clobetasol (TEMOVATE) 0.05 % ointment Apply  to affected area two (2) times a day.  hydroCHLOROthiazide (HYDRODIURIL) 12.5 mg tablet Take 1 Tab by mouth daily.  diclofenac potassium (CATAFLAM) 50 mg tablet Take 50 mg by mouth three (3) times daily.  erythromycin (ILOTYCIN) ophthalmic ointment Apply to eyelid and under eye 2 times per day     No current facility-administered medications on file prior to visit. No Known Allergies    ROS:   History obtained from the patient   Neurological: no paresthesias, weakness, or dizziness  GEN: no weight loss, weight gain, fatigue or night sweats  CV: no PND, orthopnea, or palpitations, no chest pain  Resp: no dyspnea on exertion, no cough  Abd: no nausea, vomiting or diarrhea, no bloody or black stools  Endocrine: no hair loss, excessive thirst or polyuria    Nurses note reviewed    Objective:     Visit Vitals  /86 (BP 1 Location: Left arm, BP Patient Position: Sitting)   Pulse 88   Temp 98.3 °F (36.8 °C) (Oral)   Resp 18   Ht 5' 9\" (1.753 m)   Wt 335 lb (152 kg) Comment: pt reported. SpO2 95%   BMI 49.47 kg/m²     General:   alert, cooperative and no distress   Eyes: conjunctivae/sclerae clear. PERRL, EOM's intact   Ears: External auditory canals clear, tympanic membranes clear   Sinuses/Nose: No maxillary or frontal tenderness.    Mouth:  No oral lesions, no pharyngeal erythema, no exudates   Neck: Trachea midline, no thyromegaly, no bruits   Heart: S1 and S2 normal,no murmurs noted    Lungs: Clear to auscultation bilaterally, no increased work of breathing   Abdomen: Soft, nontender. Normal bowel sounds   Extremities: No edema or cyanosis. Right anterior shin with dermal ulcer:  Size of about a dime, no exudate or surrounding erythema. Today in office I Cleaned and covered with duo-derm. Assessment/Plan:   Differential diagnosis and treatment options reviewed with patient who is in agreement with treatment plan as outlined below. ICD-10-CM ICD-9-CM    1. Essential hypertension I10 401.9 hydroCHLOROthiazide (HYDRODIURIL) 12.5 mg tablet   2. Lower extremity edema R60.0 782.3 hydroCHLOROthiazide (HYDRODIURIL) 12.5 mg tablet   3. Skin ulcer, limited to breakdown of skin ([facility] L98.491 707.9      Edema is better. BP is better/at goal.  I would like to continue on HCTZ. She is ok with this plan. Will follow up in one month for labs and recheck. Will treat dermal ulcer with duo-derm. Discussed wound care with patient. Discussed BMI and healthy weight. Encouraged patient to work to implement changes including diet high in raw fruits and vegetables, lean protein and good fats. Limit refined, processed carbohydrates and sugar. Encouraged regular exercise. She will call if leg ulcer starts to look worse. Verbal and written instructions (see AVS) provided. Patient expresses understanding and agreement of diagnosis and treatment plan.

## 2019-06-17 NOTE — PATIENT INSTRUCTIONS
DASH Diet: Care Instructions  Your Care Instructions    The DASH diet is an eating plan that can help lower your blood pressure. DASH stands for Dietary Approaches to Stop Hypertension. Hypertension is high blood pressure. The DASH diet focuses on eating foods that are high in calcium, potassium, and magnesium. These nutrients can lower blood pressure. The foods that are highest in these nutrients are fruits, vegetables, low-fat dairy products, nuts, seeds, and legumes. But taking calcium, potassium, and magnesium supplements instead of eating foods that are high in those nutrients does not have the same effect. The DASH diet also includes whole grains, fish, and poultry. The DASH diet is one of several lifestyle changes your doctor may recommend to lower your high blood pressure. Your doctor may also want you to decrease the amount of sodium in your diet. Lowering sodium while following the DASH diet can lower blood pressure even further than just the DASH diet alone. Follow-up care is a key part of your treatment and safety. Be sure to make and go to all appointments, and call your doctor if you are having problems. It's also a good idea to know your test results and keep a list of the medicines you take. How can you care for yourself at home? Following the DASH diet  · Eat 4 to 5 servings of fruit each day. A serving is 1 medium-sized piece of fruit, ½ cup chopped or canned fruit, 1/4 cup dried fruit, or 4 ounces (½ cup) of fruit juice. Choose fruit more often than fruit juice. · Eat 4 to 5 servings of vegetables each day. A serving is 1 cup of lettuce or raw leafy vegetables, ½ cup of chopped or cooked vegetables, or 4 ounces (½ cup) of vegetable juice. Choose vegetables more often than vegetable juice. · Get 2 to 3 servings of low-fat and fat-free dairy each day. A serving is 8 ounces of milk, 1 cup of yogurt, or 1 ½ ounces of cheese. · Eat 6 to 8 servings of grains each day.  A serving is 1 slice of bread, 1 ounce of dry cereal, or ½ cup of cooked rice, pasta, or cooked cereal. Try to choose whole-grain products as much as possible. · Limit lean meat, poultry, and fish to 2 servings each day. A serving is 3 ounces, about the size of a deck of cards. · Eat 4 to 5 servings of nuts, seeds, and legumes (cooked dried beans, lentils, and split peas) each week. A serving is 1/3 cup of nuts, 2 tablespoons of seeds, or ½ cup of cooked beans or peas. · Limit fats and oils to 2 to 3 servings each day. A serving is 1 teaspoon of vegetable oil or 2 tablespoons of salad dressing. · Limit sweets and added sugars to 5 servings or less a week. A serving is 1 tablespoon jelly or jam, ½ cup sorbet, or 1 cup of lemonade. · Eat less than 2,300 milligrams (mg) of sodium a day. If you limit your sodium to 1,500 mg a day, you can lower your blood pressure even more. Tips for success  · Start small. Do not try to make dramatic changes to your diet all at once. You might feel that you are missing out on your favorite foods and then be more likely to not follow the plan. Make small changes, and stick with them. Once those changes become habit, add a few more changes. · Try some of the following:  ? Make it a goal to eat a fruit or vegetable at every meal and at snacks. This will make it easy to get the recommended amount of fruits and vegetables each day. ? Try yogurt topped with fruit and nuts for a snack or healthy dessert. ? Add lettuce, tomato, cucumber, and onion to sandwiches. ? Combine a ready-made pizza crust with low-fat mozzarella cheese and lots of vegetable toppings. Try using tomatoes, squash, spinach, broccoli, carrots, cauliflower, and onions. ? Have a variety of cut-up vegetables with a low-fat dip as an appetizer instead of chips and dip. ? Sprinkle sunflower seeds or chopped almonds over salads. Or try adding chopped walnuts or almonds to cooked vegetables.   ? Try some vegetarian meals using beans and peas. Add garbanzo or kidney beans to salads. Make burritos and tacos with mashed villalta beans or black beans. Where can you learn more? Go to http://ladan-jarrod.info/. Enter O012 in the search box to learn more about \"DASH Diet: Care Instructions. \"  Current as of: July 22, 2018  Content Version: 11.9  © 8777-1237 [employer], [employer]. Care instructions adapted under license by [employer] (which disclaims liability or warranty for this information). If you have questions about a medical condition or this instruction, always ask your healthcare professional. [address] any warranty or liability for your use of this information.

## 2019-07-22 ENCOUNTER — OFFICE VISIT (OUTPATIENT)
Dept: FAMILY MEDICINE CLINIC | Age: 69
End: 2019-07-22

## 2019-07-22 VITALS
RESPIRATION RATE: 16 BRPM | BODY MASS INDEX: 43.4 KG/M2 | WEIGHT: 293 LBS | OXYGEN SATURATION: 95 % | DIASTOLIC BLOOD PRESSURE: 82 MMHG | HEIGHT: 69 IN | TEMPERATURE: 98.9 F | HEART RATE: 84 BPM | SYSTOLIC BLOOD PRESSURE: 117 MMHG

## 2019-07-22 DIAGNOSIS — E55.9 VITAMIN D DEFICIENCY: ICD-10-CM

## 2019-07-22 DIAGNOSIS — R60.0 LOWER EXTREMITY EDEMA: ICD-10-CM

## 2019-07-22 DIAGNOSIS — I10 ESSENTIAL HYPERTENSION: Primary | ICD-10-CM

## 2019-07-22 RX ORDER — CHOLECALCIFEROL (VITAMIN D3) 125 MCG
CAPSULE ORAL
COMMUNITY

## 2019-07-22 RX ORDER — HYDROCHLOROTHIAZIDE 25 MG/1
25 TABLET ORAL DAILY
Qty: 90 TAB | Refills: 0 | Status: SHIPPED | OUTPATIENT
Start: 2019-07-22 | End: 2019-10-22 | Stop reason: SDUPTHER

## 2019-07-22 NOTE — PROGRESS NOTES
Chief Complaint   Patient presents with    Leg Swelling     f/u    Medication Evaluation     HCTZ     1. Have you been to the ER, urgent care clinic since your last visit? Hospitalized since your last visit? No    2. Have you seen or consulted any other health care providers outside of the [address] [place] [address] since your last visit? Include any pap smears or colon screening. No    Health maintenance reviewed. Pt informed of health maintenance past due and/or upcoming. Pt verbalized understanding.

## 2019-07-22 NOTE — PATIENT INSTRUCTIONS
Leg and Ankle Edema: Care Instructions  Your Care Instructions  Swelling in the legs, ankles, and feet is called edema. It is common after you sit or stand for a while. Long plane flights or car rides often cause swelling in the legs and feet. You may also have swelling if you have to stand for long periods of time at your job. Problems with the veins in the legs (varicose veins) and changes in hormones can also cause swelling. Sometimes the swelling in the ankles and feet is caused by a more serious problem, such as heart failure, infection, blood clots, or liver or kidney disease. Follow-up care is a key part of your treatment and safety. Be sure to make and go to all appointments, and call your doctor if you are having problems. It's also a good idea to know your test results and keep a list of the medicines you take. How can you care for yourself at home? · If your doctor gave you medicine, take it as prescribed. Call your doctor if you think you are having a problem with your medicine. · Whenever you are resting, raise your legs up. Try to keep the swollen area higher than the level of your heart. · Take breaks from standing or sitting in one position. ? Walk around to increase the blood flow in your lower legs. ? Move your feet and ankles often while you stand, or tighten and relax your leg muscles. · Wear support stockings. Put them on in the morning, before swelling gets worse. · Eat a balanced diet. Lose weight if you need to. · Limit the amount of salt (sodium) in your diet. Salt holds fluid in the body and may increase swelling. When should you call for help? Call 911 anytime you think you may need emergency care. For example, call if:    · You have symptoms of a blood clot in your lung (called a pulmonary embolism). These may include:  ? Sudden chest pain. ? Trouble breathing. ?  Coughing up blood.    Call your doctor now or seek immediate medical care if:    · You have signs of a blood clot, such as:  ? Pain in your calf, back of the knee, thigh, or groin. ? Redness and swelling in your leg or groin.     · You have symptoms of infection, such as:  ? Increased pain, swelling, warmth, or redness. ? Red streaks or pus. ? A fever.    Watch closely for changes in your health, and be sure to contact your doctor if:    · Your swelling is getting worse.     · You have new or worsening pain in your legs.     · You do not get better as expected. Where can you learn more? Go to http://ladan-jarrod.info/. Enter G842 in the search box to learn more about \"Leg and Ankle Edema: Care Instructions. \"  Current as of: September 23, 2018  Content Version: 12.1  © 6852-4531 [employer]. Care instructions adapted under license by [employer] (which disclaims liability or warranty for this information). If you have questions about a medical condition or this instruction, always ask your healthcare professional. [address] any warranty or liability for your use of this information. DASH Diet: Care Instructions  Your Care Instructions    The DASH diet is an eating plan that can help lower your blood pressure. DASH stands for Dietary Approaches to Stop Hypertension. Hypertension is high blood pressure. The DASH diet focuses on eating foods that are high in calcium, potassium, and magnesium. These nutrients can lower blood pressure. The foods that are highest in these nutrients are fruits, vegetables, low-fat dairy products, nuts, seeds, and legumes. But taking calcium, potassium, and magnesium supplements instead of eating foods that are high in those nutrients does not have the same effect. The DASH diet also includes whole grains, fish, and poultry. The DASH diet is one of several lifestyle changes your doctor may recommend to lower your high blood pressure. Your doctor may also want you to decrease the amount of sodium in your diet. Lowering sodium while following the DASH diet can lower blood pressure even further than just the DASH diet alone. Follow-up care is a key part of your treatment and safety. Be sure to make and go to all appointments, and call your doctor if you are having problems. It's also a good idea to know your test results and keep a list of the medicines you take. How can you care for yourself at home? Following the DASH diet  · Eat 4 to 5 servings of fruit each day. A serving is 1 medium-sized piece of fruit, ½ cup chopped or canned fruit, 1/4 cup dried fruit, or 4 ounces (½ cup) of fruit juice. Choose fruit more often than fruit juice. · Eat 4 to 5 servings of vegetables each day. A serving is 1 cup of lettuce or raw leafy vegetables, ½ cup of chopped or cooked vegetables, or 4 ounces (½ cup) of vegetable juice. Choose vegetables more often than vegetable juice. · Get 2 to 3 servings of low-fat and fat-free dairy each day. A serving is 8 ounces of milk, 1 cup of yogurt, or 1 ½ ounces of cheese. · Eat 6 to 8 servings of grains each day. A serving is 1 slice of bread, 1 ounce of dry cereal, or ½ cup of cooked rice, pasta, or cooked cereal. Try to choose whole-grain products as much as possible. · Limit lean meat, poultry, and fish to 2 servings each day. A serving is 3 ounces, about the size of a deck of cards. · Eat 4 to 5 servings of nuts, seeds, and legumes (cooked dried beans, lentils, and split peas) each week. A serving is 1/3 cup of nuts, 2 tablespoons of seeds, or ½ cup of cooked beans or peas. · Limit fats and oils to 2 to 3 servings each day. A serving is 1 teaspoon of vegetable oil or 2 tablespoons of salad dressing. · Limit sweets and added sugars to 5 servings or less a week. A serving is 1 tablespoon jelly or jam, ½ cup sorbet, or 1 cup of lemonade. · Eat less than 2,300 milligrams (mg) of sodium a day. If you limit your sodium to 1,500 mg a day, you can lower your blood pressure even more.   Tips for success  · Start small. Do not try to make dramatic changes to your diet all at once. You might feel that you are missing out on your favorite foods and then be more likely to not follow the plan. Make small changes, and stick with them. Once those changes become habit, add a few more changes. · Try some of the following:  ? Make it a goal to eat a fruit or vegetable at every meal and at snacks. This will make it easy to get the recommended amount of fruits and vegetables each day. ? Try yogurt topped with fruit and nuts for a snack or healthy dessert. ? Add lettuce, tomato, cucumber, and onion to sandwiches. ? Combine a ready-made pizza crust with low-fat mozzarella cheese and lots of vegetable toppings. Try using tomatoes, squash, spinach, broccoli, carrots, cauliflower, and onions. ? Have a variety of cut-up vegetables with a low-fat dip as an appetizer instead of chips and dip. ? Sprinkle sunflower seeds or chopped almonds over salads. Or try adding chopped walnuts or almonds to cooked vegetables. ? Try some vegetarian meals using beans and peas. Add garbanzo or kidney beans to salads. Make burritos and tacos with mashed villalta beans or black beans. Where can you learn more? Go to http://ladan-jarrod.info/. Enter O768 in the search box to learn more about \"DASH Diet: Care Instructions. \"  Current as of: July 22, 2018  Content Version: 12.1  © 0116-7672 [employer]. Care instructions adapted under license by [employer] (which disclaims liability or warranty for this information). If you have questions about a medical condition or this instruction, always ask your healthcare professional. [place] [address] any warranty or liability for your use of this information. Learning About Low-Carbohydrate Diets for Weight Loss  What is a low-carbohydrate diet? Low-carb diets avoid foods that are high in carbohydrate.  These high-carb foods include pasta, bread, rice, cereal, fruits, and starchy vegetables. Instead, these diets usually have you eat foods that are high in fat and protein. Many people lose weight quickly on a low-carb diet. But the early weight loss is water. People on this diet often gain the weight back after they start eating carbs again. Not all diet plans are safe or work well. A lot of the evidence shows that low-carb diets aren't healthy. That's because these diets often don't include healthy foods like fruits and vegetables. Losing weight safely means balancing protein, fat, and carbs with every meal and snack. And low-carb diets don't always provide the vitamins, minerals, and fiber you need. If you have a serious medical condition, talk to your doctor before you try any diet. These conditions include kidney disease, heart disease, type 2 diabetes, high cholesterol, and high blood pressure. If you are pregnant, it may not be safe for your baby if you are on a low-carb diet. How can you lose weight safely? You might have heard that a diet plan helped another person lose weight. But that doesn't mean that it will work for you. It is very hard to stay on a diet that includes lots of big changes in your eating habits. If you want to get to a healthy weight and stay there, making healthy lifestyle changes will often work better than dieting. These steps can help. · Make a plan for change. Work with your doctor to create a plan that is right for you. · See a dietitian. He or she can show you how to make healthy changes in your eating habits. · Manage stress. If you have a lot of stress in your life, it can be hard to focus on making healthy changes to your daily habits. · Track your food and activity. You are likely to do better at losing weight if you keep track of what you eat and what you do. Follow-up care is a key part of your treatment and safety.  Be sure to make and go to all appointments, and call your doctor if you are having problems. It's also a good idea to know your test results and keep a list of the medicines you take. Where can you learn more? Go to http://ladan-jarrod.info/. Enter A121 in the search box to learn more about \"Learning About Low-Carbohydrate Diets for Weight Loss. \"  Current as of: November 7, 2018  Content Version: 12.1  © 0716-0532 [employer]wise, [employer] ([employer]). Care instructions adapted under license by [employer] (which disclaims liability or warranty for this information). If you have questions about a medical condition or this instruction, always ask your healthcare professional. [address] any warranty or liability for your use of this information.

## 2019-07-22 NOTE — PROGRESS NOTES
Subjective:      Chief Complaint   Patient presents with    Leg Swelling     f/u    Medication Evaluation     HCTZ       Kay Contreras is a 81 y.o. female with history of hypertension, here for follow up. Hypertension ROS: taking medications as instructed, no medication side effects noted, home BP monitoring in range of 999'O systolic over 92'I diastolic, no TIA's, no chest pain on exertion, no dyspnea on exertion, +swelling of ankles chronic, no palpitations,headaches, shortness of breath, vision change. she generally follows a low fat low cholesterol diet, generally follows a low sodium diet. Says she increased her HCTZ to 25 mg daily last week, total 3 days she took 25mg. Feeling pretty good on this dose. No recent hospitalizations since last visit. Past Medical History:   Diagnosis Date    Arthritis     Eczema     Hyperlipidemia     Hypothyroid      Past Surgical History:   Procedure Laterality Date    HX GYN      tubal pregnancy    HX ORTHOPAEDIC  1998    bone spur     Social History     Tobacco Use    Smoking status: Current Every Day Smoker     Packs/day: 1.00     Years: 30.00     Pack years: 30.00     Types: Cigarettes    Smokeless tobacco: Never Used   Substance Use Topics    Alcohol use: No     Alcohol/week: 0.0 standard drinks     family history is not on file. Current Outpatient Medications on File Prior to Visit   Medication Sig    cholecalciferol, vitamin D3, (VITAMIN D3) 2,000 unit tab Take  by mouth.  hydroCHLOROthiazide (HYDRODIURIL) 12.5 mg tablet Take 1 Tab by mouth daily.  clobetasol (TEMOVATE) 0.05 % ointment Apply  to affected area two (2) times a day.  diclofenac potassium (CATAFLAM) 50 mg tablet Take 50 mg by mouth three (3) times daily.  erythromycin (ILOTYCIN) ophthalmic ointment Apply to eyelid and under eye 2 times per day    ergocalciferol (ERGOCALCIFEROL) 50,000 unit capsule Take 1 Cap by mouth every seven (7) days.     mupirocin (BACTROBAN) 2 % ointment Apply  to affected area two (2) times a day. For 2 weeks     No current facility-administered medications on file prior to visit. No Known Allergies    ROS:   History obtained from the patient   Neurological: no paresthesias, weakness, or dizziness  GEN: no weight loss, weight gain, fatigue or night sweats  CV: no PND, orthopnea, or palpitations, no chest pain  Resp: no dyspnea on exertion, no cough  Abd: no nausea, vomiting or diarrhea, no bloody or black stools  Endocrine: no hair loss, excessive thirst or polyuria    Nurses note reviewed    Objective:     Visit Vitals  /82 (BP 1 Location: Left arm, BP Patient Position: Sitting)   Pulse 84   Temp 98.9 °F (37.2 °C) (Oral)   Resp 16   Ht 5' 9\" (1.753 m)   Wt 342 lb (155.1 kg)   SpO2 95%   BMI 50.50 kg/m²     General:   alert, cooperative and no distress   Eyes: conjunctivae/sclerae clear. PERRL, EOM's intact   Ears: External auditory canals clear, tympanic membranes clear   Sinuses/Nose: No maxillary or frontal tenderness. Mouth:  No oral lesions, no pharyngeal erythema, no exudates   Neck: Trachea midline, no thyromegaly, no bruits   Heart: S1 and S2 normal,no murmurs noted    Lungs: Clear to auscultation bilaterally, no increased work of breathing   Abdomen: Soft, nontender. Normal bowel sounds   Extremities: Trace bilateral lower ext. Edema. No cyanosis           Assessment/Plan:   Differential diagnosis and treatment options reviewed with patient who is in agreement with treatment plan as outlined below. ICD-10-CM ICD-9-CM    1. Essential hypertension K26 677.4 METABOLIC PANEL, BASIC      hydroCHLOROthiazide (HYDRODIURIL) 25 mg tablet   2. Lower extremity edema A79.2 687.1 METABOLIC PANEL, BASIC      hydroCHLOROthiazide (HYDRODIURIL) 25 mg tablet   3. Vitamin D deficiency E55.9 268.9    4. Acquired hypothyroidism E03.9 244.9      BMP repeat today. HCTZ 25mg daily. Discussed BMI and healthy weight.  Encouraged patient to work to implement changes including diet high in raw fruits and vegetables, lean protein and good fats. Limit refined, processed carbohydrates and sugar. Encouraged regular exercise. Follow up three months or sooner . Verbal and written instructions (see AVS) provided. Patient expresses understanding and agreement of diagnosis and treatment plan.

## 2019-07-23 LAB
BUN SERPL-MCNC: 13 MG/DL (ref 8–27)
BUN/CREAT SERPL: 16 (ref 12–28)
CALCIUM SERPL-MCNC: 9 MG/DL (ref 8.7–10.3)
CHLORIDE SERPL-SCNC: 97 MMOL/L (ref 96–106)
CO2 SERPL-SCNC: 20 MMOL/L (ref 20–29)
CREAT SERPL-MCNC: 0.83 MG/DL (ref 0.57–1)
GLUCOSE SERPL-MCNC: 81 MG/DL (ref 65–99)
POTASSIUM SERPL-SCNC: 4.3 MMOL/L (ref 3.5–5.2)
SODIUM SERPL-SCNC: 136 MMOL/L (ref 134–144)

## 2019-07-26 ENCOUNTER — TELEPHONE (OUTPATIENT)
Dept: FAMILY MEDICINE CLINIC | Age: 69
End: 2019-07-26

## 2019-10-22 DIAGNOSIS — R60.0 LOWER EXTREMITY EDEMA: ICD-10-CM

## 2019-10-22 DIAGNOSIS — I10 ESSENTIAL HYPERTENSION: ICD-10-CM

## 2019-10-23 RX ORDER — HYDROCHLOROTHIAZIDE 25 MG/1
TABLET ORAL
Qty: 90 TAB | Refills: 0 | Status: SHIPPED | OUTPATIENT
Start: 2019-10-23 | End: 2019-12-23

## 2019-10-28 ENCOUNTER — OFFICE VISIT (OUTPATIENT)
Dept: FAMILY MEDICINE CLINIC | Age: 69
End: 2019-10-28

## 2019-10-28 VITALS
HEIGHT: 69 IN | SYSTOLIC BLOOD PRESSURE: 131 MMHG | WEIGHT: 293 LBS | TEMPERATURE: 98.6 F | RESPIRATION RATE: 16 BRPM | DIASTOLIC BLOOD PRESSURE: 83 MMHG | OXYGEN SATURATION: 95 % | HEART RATE: 84 BPM | BODY MASS INDEX: 43.4 KG/M2

## 2019-10-28 DIAGNOSIS — L30.9 ECZEMA, UNSPECIFIED TYPE: ICD-10-CM

## 2019-10-28 DIAGNOSIS — I10 ESSENTIAL HYPERTENSION: Primary | ICD-10-CM

## 2019-10-28 DIAGNOSIS — Z12.11 SPECIAL SCREENING FOR MALIGNANT NEOPLASMS, COLON: ICD-10-CM

## 2019-10-28 NOTE — PATIENT INSTRUCTIONS
Stopping Smoking: Care Instructions  Your Care Instructions  Cigarette smokers crave the nicotine in cigarettes. Giving it up is much harder than simply changing a habit. Your body has to stop craving the nicotine. It is hard to quit, but you can do it. There are many tools that people use to quit smoking. You may find that combining tools works best for you. There are several steps to quitting. First you get ready to quit. Then you get support to help you. After that, you learn new skills and behaviors to become a nonsmoker. For many people, a necessary step is getting and using medicine. Your doctor will help you set up the plan that best meets your needs. You may want to attend a smoking cessation program to help you quit smoking. When you choose a program, look for one that has proven success. Ask your doctor for ideas. You will greatly increase your chances of success if you take medicine as well as get counseling or join a cessation program.  Some of the changes you feel when you first quit tobacco are uncomfortable. Your body will miss the nicotine at first, and you may feel short-tempered and grumpy. You may have trouble sleeping or concentrating. Medicine can help you deal with these symptoms. You may struggle with changing your smoking habits and rituals. The last step is the tricky one: Be prepared for the smoking urge to continue for a time. This is a lot to deal with, but keep at it. You will feel better. Follow-up care is a key part of your treatment and safety. Be sure to make and go to all appointments, and call your doctor if you are having problems. It's also a good idea to know your test results and keep a list of the medicines you take. How can you care for yourself at home? · Ask your family, friends, and coworkers for support. You have a better chance of quitting if you have help and support.   · Join a support group, such as Nicotine Anonymous, for people who are trying to quit smoking. · Consider signing up for a smoking cessation program, such as the American Lung Association's Freedom from Smoking program.  · Get text messaging support. Go to the website at www.smokefree. gov to sign up for the [facility] program.  · Set a quit date. Pick your date carefully so that it is not right in the middle of a big deadline or stressful time. Once you quit, do not even take a puff. Get rid of all ashtrays and lighters after your last cigarette. Clean your house and your clothes so that they do not smell of smoke. · Learn how to be a nonsmoker. Think about ways you can avoid those things that make you reach for a cigarette. ? Avoid situations that put you at greatest risk for smoking. For some people, it is hard to have a drink with friends without smoking. For others, they might skip a coffee break with coworkers who smoke. ? Change your daily routine. Take a different route to work or eat a meal in a different place. · Cut down on stress. Calm yourself or release tension by doing an activity you enjoy, such as reading a book, taking a hot bath, or gardening. · Talk to your doctor or pharmacist about nicotine replacement therapy, which replaces the nicotine in your body. You still get nicotine but you do not use tobacco. Nicotine replacement products help you slowly reduce the amount of nicotine you need. These products come in several forms, many of them available over-the-counter:  ? Nicotine patches  ? Nicotine gum and lozenges  ? Nicotine inhaler  · Ask your doctor about bupropion (Wellbutrin) or varenicline (Chantix), which are prescription medicines. They do not contain nicotine. They help you by reducing withdrawal symptoms, such as stress and anxiety. · Some people find hypnosis, acupuncture, and massage helpful for ending the smoking habit. · Eat a healthy diet and get regular exercise. Having healthy habits will help your body move past its craving for nicotine.   · Be prepared to keep trying. Most people are not successful the first few times they try to quit. Do not get mad at yourself if you smoke again. Make a list of things you learned and think about when you want to try again, such as next week, next month, or next year. Where can you learn more? Go to http://ladan-jarrod.info/. Enter B695 in the search box to learn more about \"Stopping Smoking: Care Instructions. \"  Current as of: September 26, 2018  Content Version: 12.2  © 8775-7409 [employer]. Care instructions adapted under license by [employer] (which disclaims liability or warranty for this information). If you have questions about a medical condition or this instruction, always ask your healthcare professional. [address] any warranty or liability for your use of this information.

## 2019-10-28 NOTE — PROGRESS NOTES
Subjective:      Chief Complaint   Patient presents with    Thyroid Problem     f/u    Hypertension     f/u    Medication Evaluation       Malachi Dempsey is a 81 y.o. female with history of hypertension, here for follow up. Hypertension ROS: taking medications as instructed, no medication side effects noted, home BP monitoring in range of 245-726'Q systolic over 53-32'D diastolic (wrist cuff at home), no TIA's, no chest pain on exertion, no dyspnea on exertion, +swelling of ankles chronic, no palpitations,headaches, shortness of breath, vision change. she generally follows a low fat low cholesterol diet, generally follows a low sodium diet. +smoker         No recent hospitalizations since last visit. Past Medical History:   Diagnosis Date    Arthritis     Eczema     Hyperlipidemia     Hypothyroid      Past Surgical History:   Procedure Laterality Date    HX GYN      tubal pregnancy    HX ORTHOPAEDIC  1998    bone spur     Social History     Tobacco Use    Smoking status: Current Every Day Smoker     Packs/day: 1.00     Years: 30.00     Pack years: 30.00     Types: Cigarettes    Smokeless tobacco: Never Used   Substance Use Topics    Alcohol use: No     Alcohol/week: 0.0 standard drinks     family history is not on file. Current Outpatient Medications on File Prior to Visit   Medication Sig    hydroCHLOROthiazide (HYDRODIURIL) 25 mg tablet TAKE 1 TABLET BY MOUTH DAILY.  cholecalciferol, vitamin D3, (VITAMIN D3) 2,000 unit tab Take  by mouth.  clobetasol (TEMOVATE) 0.05 % ointment Apply  to affected area two (2) times a day.  diclofenac potassium (CATAFLAM) 50 mg tablet Take 50 mg by mouth three (3) times daily.  erythromycin (ILOTYCIN) ophthalmic ointment Apply to eyelid and under eye 2 times per day     No current facility-administered medications on file prior to visit.       No Known Allergies    ROS:   History obtained from the patient   Neurological: no paresthesias, weakness, or dizziness  GEN: no weight loss, weight gain, fatigue or night sweats  CV: no PND, orthopnea, or palpitations, no chest pain  Resp: no dyspnea on exertion, no cough  Abd: no nausea, vomiting or diarrhea, no bloody or black stools  Endocrine: no hair loss, excessive thirst or polyuria    Nurses note reviewed    Objective:     Visit Vitals  /83 (BP 1 Location: Left arm, BP Patient Position: Sitting)   Pulse 84   Temp 98.6 °F (37 °C) (Oral)   Resp 16   Ht 5' 9\" (1.753 m)   Wt 337 lb (152.9 kg)   SpO2 95%   BMI 49.77 kg/m²     General:   alert, cooperative and no distress   Eyes: conjunctivae/sclerae clear. PERRL, EOM's intact   Ears: External auditory canals clear, tympanic membranes clear   Sinuses/Nose: No maxillary or frontal tenderness. Mouth:  No oral lesions, no pharyngeal erythema, no exudates   Neck: Trachea midline, no thyromegaly, no bruits   Heart: S1 and S2 normal,no murmurs noted    Lungs: Clear to auscultation bilaterally, no increased work of breathing   Abdomen: Soft, nontender. Normal bowel sounds   Extremities: Trace bilateral edema, but no cyanosis           Assessment/Plan:   Differential diagnosis and treatment options reviewed with patient who is in agreement with treatment plan as outlined below. ICD-10-CM ICD-9-CM    1. Essential hypertension I10 401.9    2. Special screening for malignant neoplasms, colon Z12.11 V76.51 OCCULT BLOOD IMMUNOASSAY,DIAGNOSTIC   3. Eczema, unspecified type L30.9 692.9      BP at goal.  No change in therapy at this time. Fit test today. Unsure what happened to her last test result, she says she did it and send it. Will give repeat test today. Eczema controlled on legs. Smoking cessation discussed, she declines at this time. Discussed BMI and healthy weight. Encouraged patient to work to implement changes including diet high in raw fruits and vegetables, lean protein and good fats. Limit refined, processed carbohydrates and sugar.  Encouraged regular exercise. Follow up 6 months or sooner if needed. Verbal and written instructions (see AVS) provided. Patient expresses understanding and agreement of diagnosis and treatment plan.

## 2019-10-28 NOTE — PROGRESS NOTES
Chief Complaint   Patient presents with    Thyroid Problem     f/u    Hypertension     f/u    Medication Evaluation       1. Have you been to the ER, urgent care clinic since your last visit? Hospitalized since your last visit? No    2. Have you seen or consulted any other health care providers outside of the [address] [place] [address] since your last visit? Include any pap smears or colon screening. No    Health maintenance reviewed. Pt informed of health maintenance past due and/or upcoming. Pt verbalized understanding. Health Maintenance Due   Topic Date Due    Shingrix Vaccine Age 49> (1 of 2) 04/08/2002    GLAUCOMA SCREENING Q2Y  04/08/2017    FOBT Q 1 YEAR AGE 50-75  09/27/2018    Influenza Age 9 to Adult  08/01/2019     Pt is fasting this visit. Pt declined flu vaccine this visit.

## 2019-12-19 DIAGNOSIS — R60.0 LOWER EXTREMITY EDEMA: ICD-10-CM

## 2019-12-19 DIAGNOSIS — I10 ESSENTIAL HYPERTENSION: ICD-10-CM

## 2019-12-23 RX ORDER — HYDROCHLOROTHIAZIDE 25 MG/1
TABLET ORAL
Qty: 90 TAB | Refills: 0 | Status: SHIPPED | OUTPATIENT
Start: 2019-12-23 | End: 2020-03-10 | Stop reason: SDUPTHER

## 2020-01-16 ENCOUNTER — TELEPHONE (OUTPATIENT)
Dept: FAMILY MEDICINE CLINIC | Age: 70
End: 2020-01-16

## 2020-01-16 NOTE — TELEPHONE ENCOUNTER
Pt is calling wanting to be seen ankles and leg is all swollen fluid is dripping out leg states it is painful

## 2020-01-16 NOTE — TELEPHONE ENCOUNTER
verified. She states that she went to the ED the other day for fever/chills and was dx'd with dehydration. She was given per patient 2 bags of fluid and was tested negative for flu, pneumonia, and bronchitis. She also states that her labs are stable. She has c/o bilateral leg swelling with redness, pain, and weeping. Luciano Maza NP notified. Advised patient that she can go to the nearest ER, urgent care, or I can schedule her for 1500 with Kimberlee.   Patient took the 1500 appointment 20 with Luciano Maza NP.

## 2020-01-17 ENCOUNTER — HOSPITAL ENCOUNTER (EMERGENCY)
Age: 70
Discharge: HOME | End: 2020-01-17
Attending: EMERGENCY MEDICINE
Payer: COMMERCIAL

## 2020-01-17 ENCOUNTER — OFFICE VISIT (OUTPATIENT)
Dept: FAMILY MEDICINE CLINIC | Age: 70
End: 2020-01-17

## 2020-01-17 VITALS
TEMPERATURE: 98.4 F | DIASTOLIC BLOOD PRESSURE: 76 MMHG | WEIGHT: 293 LBS | SYSTOLIC BLOOD PRESSURE: 132 MMHG | HEART RATE: 88 BPM | BODY MASS INDEX: 43.4 KG/M2 | OXYGEN SATURATION: 98 % | RESPIRATION RATE: 18 BRPM | HEIGHT: 69 IN

## 2020-01-17 VITALS
HEIGHT: 69 IN | DIASTOLIC BLOOD PRESSURE: 82 MMHG | BODY MASS INDEX: 43.4 KG/M2 | TEMPERATURE: 98.7 F | OXYGEN SATURATION: 97 % | SYSTOLIC BLOOD PRESSURE: 144 MMHG | HEART RATE: 89 BPM | RESPIRATION RATE: 26 BRPM | WEIGHT: 293 LBS

## 2020-01-17 DIAGNOSIS — L03.116 LEFT LEG CELLULITIS: Primary | ICD-10-CM

## 2020-01-17 DIAGNOSIS — L03.116 CELLULITIS OF LEFT LOWER EXTREMITY: Primary | ICD-10-CM

## 2020-01-17 DIAGNOSIS — M79.89 LEFT LEG SWELLING: ICD-10-CM

## 2020-01-17 LAB
ALBUMIN SERPL-MCNC: 3 G/DL (ref 3.5–5)
ALBUMIN/GLOB SERPL: 0.8 {RATIO} (ref 1.1–2.2)
ALP SERPL-CCNC: 65 U/L (ref 45–117)
ALT SERPL-CCNC: 20 U/L (ref 12–78)
ANION GAP SERPL CALC-SCNC: 5 MMOL/L (ref 5–15)
AST SERPL-CCNC: 24 U/L (ref 15–37)
BASOPHILS # BLD: 0 K/UL (ref 0–0.1)
BASOPHILS NFR BLD: 0 % (ref 0–1)
BILIRUB SERPL-MCNC: 0.7 MG/DL (ref 0.2–1)
BUN SERPL-MCNC: 12 MG/DL (ref 6–20)
BUN/CREAT SERPL: 14 (ref 12–20)
CALCIUM SERPL-MCNC: 9.1 MG/DL (ref 8.5–10.1)
CHLORIDE SERPL-SCNC: 105 MMOL/L (ref 97–108)
CO2 SERPL-SCNC: 30 MMOL/L (ref 21–32)
CREAT SERPL-MCNC: 0.83 MG/DL (ref 0.55–1.02)
DIFFERENTIAL METHOD BLD: ABNORMAL
EOSINOPHIL # BLD: 0.2 K/UL (ref 0–0.4)
EOSINOPHIL NFR BLD: 2 % (ref 0–7)
ERYTHROCYTE [DISTWIDTH] IN BLOOD BY AUTOMATED COUNT: 13.3 % (ref 11.5–14.5)
GLOBULIN SER CALC-MCNC: 3.8 G/DL (ref 2–4)
GLUCOSE SERPL-MCNC: 80 MG/DL (ref 65–100)
HCT VFR BLD AUTO: 41.4 % (ref 35–47)
HGB BLD-MCNC: 13.6 G/DL (ref 11.5–16)
IMM GRANULOCYTES # BLD AUTO: 0 K/UL (ref 0–0.04)
IMM GRANULOCYTES NFR BLD AUTO: 0 % (ref 0–0.5)
LACTATE SERPL-SCNC: 1.6 MMOL/L (ref 0.4–2)
LYMPHOCYTES # BLD: 1.3 K/UL (ref 0.8–3.5)
LYMPHOCYTES NFR BLD: 15 % (ref 12–49)
MCH RBC QN AUTO: 31.1 PG (ref 26–34)
MCHC RBC AUTO-ENTMCNC: 32.9 G/DL (ref 30–36.5)
MCV RBC AUTO: 94.5 FL (ref 80–99)
MONOCYTES # BLD: 0.5 K/UL (ref 0–1)
MONOCYTES NFR BLD: 6 % (ref 5–13)
NEUTS SEG # BLD: 6.5 K/UL (ref 1.8–8)
NEUTS SEG NFR BLD: 77 % (ref 32–75)
NRBC # BLD: 0 K/UL (ref 0–0.01)
NRBC BLD-RTO: 0 PER 100 WBC
PLATELET # BLD AUTO: 129 K/UL (ref 150–400)
PMV BLD AUTO: 10.2 FL (ref 8.9–12.9)
POTASSIUM SERPL-SCNC: 4.2 MMOL/L (ref 3.5–5.1)
PROT SERPL-MCNC: 6.8 G/DL (ref 6.4–8.2)
RBC # BLD AUTO: 4.38 M/UL (ref 3.8–5.2)
RBC MORPH BLD: ABNORMAL
SODIUM SERPL-SCNC: 140 MMOL/L (ref 136–145)
WBC # BLD AUTO: 8.5 K/UL (ref 3.6–11)

## 2020-01-17 PROCEDURE — 99283 EMERGENCY DEPT VISIT LOW MDM: CPT

## 2020-01-17 PROCEDURE — 74011000258 HC RX REV CODE- 258: Performed by: EMERGENCY MEDICINE

## 2020-01-17 PROCEDURE — 83605 ASSAY OF LACTIC ACID: CPT

## 2020-01-17 PROCEDURE — 85025 COMPLETE CBC W/AUTO DIFF WBC: CPT

## 2020-01-17 PROCEDURE — 96365 THER/PROPH/DIAG IV INF INIT: CPT

## 2020-01-17 PROCEDURE — 80053 COMPREHEN METABOLIC PANEL: CPT

## 2020-01-17 PROCEDURE — 36415 COLL VENOUS BLD VENIPUNCTURE: CPT

## 2020-01-17 PROCEDURE — 74011250636 HC RX REV CODE- 250/636: Performed by: EMERGENCY MEDICINE

## 2020-01-17 RX ORDER — CEPHALEXIN 500 MG/1
500 CAPSULE ORAL 4 TIMES DAILY
Qty: 28 CAP | Refills: 0 | Status: SHIPPED | OUTPATIENT
Start: 2020-01-17 | End: 2020-01-24

## 2020-01-17 RX ORDER — BENZONATATE 100 MG/1
CAPSULE ORAL
COMMUNITY
Start: 2020-01-03 | End: 2020-07-10

## 2020-01-17 RX ORDER — DOXYCYCLINE HYCLATE 100 MG
100 TABLET ORAL 2 TIMES DAILY
Qty: 20 TAB | Refills: 0 | Status: SHIPPED | OUTPATIENT
Start: 2020-01-17 | End: 2020-01-27

## 2020-01-17 RX ADMIN — CEFTRIAXONE 1 G: 1 INJECTION, POWDER, FOR SOLUTION INTRAMUSCULAR; INTRAVENOUS at 21:10

## 2020-01-17 NOTE — PROGRESS NOTES
Chief Complaint   Patient presents with    Leg Swelling     left lower extremity     Skin Problem     left lower extremity      1. Have you been to the ER, urgent care clinic since your last visit? Hospitalized since your last visit? Yes When: Pt went to the ER on Monday     2. Have you seen or consulted any other health care providers outside of the [address] [place] [address] since your last visit? Include any pap smears or colon screening. No    Health maintenance reviewed. Pt informed of health maintenance past due and/or upcoming. Pt verbalized understanding.      Health Maintenance Due   Topic Date Due    GLAUCOMA SCREENING Q2Y  04/08/2017    FOBT Q 1 YEAR AGE 50-75  09/27/2018

## 2020-01-17 NOTE — LETTER
[facility] EMERGENCY DEPT 
[address] [place] [id number] [phone] Work/School Note Date: 1/17/2020 To Whom It May concern: 
 
Efrenubaldo Licea was seen and treated today in the ER. Please excuse her from work on 1/20/20.

## 2020-01-17 NOTE — PROGRESS NOTES
Subjective:     Chief Complaint   Patient presents with    Leg Swelling     left lower extremity     Skin Problem     left lower extremity         HPI:  Felipa Dandy is a 81 y.o. female here today with complaints of left lower leg swelling and redness. Says on Tuesday (3 days ago) she started with sudden onset fever at work (had severe chills), ended up going to [facility] ER and told that she had a fever of over 102. Had CXR and labs and told her she was dehydrated and ER gave her 2 bags of IVF. Discharged from the ER same day, no prescribed medicines. Says that same night she noticed some left leg redness, and on Wednesday she started having swelling. Called here yesterday to discuss with nurse, was told that she will probably need to go back to the emergency room or that she could be seen today after 3 PM.  She declined ER visit yesterday and came in to see me today. Now left lower leg very red and swollen, just that the redness has now extended past the posterior part of her knee onto her lower thigh. She has a history of lower extremity eczema that tends to get flares, but notes that this is nothing like any eczema flare she is ever had. States that the left lower leg was hurting Wednesday and yesterday but not too much  pain today, no known fever since Tuesday. She has some shortness of breath on exertion but no other symptoms. She notes that her foot is so swollen that she could not put on any shoes and has to wear slippers. She denies any known puncture or injury to the lower leg. No hospital, ER or specialist visits since last primary care visit except as noted above.     Past Medical History:   Diagnosis Date    Arthritis     Eczema     Hyperlipidemia     Hypothyroid        Social History     Tobacco Use    Smoking status: Current Every Day Smoker     Packs/day: 1.00     Years: 30.00     Pack years: 30.00     Types: Cigarettes    Smokeless tobacco: Never Used   Substance Use Topics    Alcohol use: No     Alcohol/week: 0.0 standard drinks    Drug use: No       Outpatient Medications Marked as Taking for the 1/17/20 encounter (Office Visit) with Jonh Guan NP   Medication Sig Dispense Refill    benzonatate (TESSALON) 100 mg capsule       hydroCHLOROthiazide (HYDRODIURIL) 25 mg tablet TAKE 1 TABLET BY MOUTH DAILY 90 Tab 0    cholecalciferol, vitamin D3, (VITAMIN D3) 2,000 unit tab Take  by mouth.  clobetasol (TEMOVATE) 0.05 % ointment Apply  to affected area two (2) times a day. 60 g 3    diclofenac potassium (CATAFLAM) 50 mg tablet Take 50 mg by mouth three (3) times daily.  erythromycin (ILOTYCIN) ophthalmic ointment Apply to eyelid and under eye 2 times per day 3.5 g 0       No Known Allergies    Health Maintenance reviewed -      ROS:  Gen: no fatigue,no unexplained weight loss or weight gain  Eyes: no excessive tearing, itching, or discharge  Nose: no rhinorrhea, no sinus pain  Mouth: no oral lesions, no sore throat, no difficulty swallowing  Resp: no shortness of breath, no wheezing, no cough  CV: no chest pain, no orthopnea, no paroxysmal nocturnal dyspnea,   Abd: no nausea, no heartburn, no diarrhea, no constipation, no abdominal pain  Neuro: no headaches, no syncope or presyncopal episodes  Endo: no polyuria, no polydipsia.     : no hematuria, no dysuria, no frequency, no incontinence  Heme: no lymphadenopathy, no easy bruising or bleeding, no night sweats      PE:  Visit Vitals  /82 (BP 1 Location: Left arm, BP Patient Position: Sitting)   Pulse 89   Temp 98.7 °F (37.1 °C) (Oral)   Resp 26   Ht 5' 9\" (1.753 m)   Wt 340 lb (154.2 kg)   SpO2 97%   BMI 50.21 kg/m²     Gen: alert, oriented, no acute distress  Head: normocephalic, atraumatic  Eyes: pupils equal round reactive to light, sclera clear, conjunctiva clear  Oral: moist mucus membranes, no oral lesions, no pharyngeal inflammation or exudate  Neck: symmetric normal sized thyroid, no carotid bruits, no jugular vein distention  Resp: no increase work of breathing, lungs clear to ausculation bilaterally, no wheezing, rales or rhonchi  CV: S1, S2 normal.  No murmurs, rubs, or gallops. Abd: soft, not tender, not distended. No hepatosplenomegaly. Normal bowel sounds. Neuro: cranial nerves intact, normal strength and movement in all extremities, and sensation intact and symmetric. Skin: Left lower leg with significant erythema from toes to anterior shin just below the patella with erythema spreading to posterior calf and medial lower thigh. She has eczema plaques to the posterior calf. Extremities: no cyanosis or edema right  Left le+ edema to lower left leg that extends all the way to toes; some areas with weeping edema    No results found for this visit on 20. Assessment/Plan:  Differential diagnosis and treatment options reviewed with patient who is in agreement with treatment plan as outlined below. ICD-10-CM ICD-9-CM    1. Left leg cellulitis L03.116 682.6    2. Left leg swelling M79.89 729.81      Given elevated white blood cell count on  and now with extreme erythema and swelling to left lower extremity, I cannot be certain to rule out DVT and/or cellulitis. I explained to her that I would not be able to effectively and efficiently evaluate this in the outpatient setting today, I am worried about how fast the erythema is spreading up her leg as well. I have advised for her to go back to the emergency room for further evaluation. She will need blood work and a venous duplex to rule out DVT and likely will need some IV antibiotics for the cellulitis. She states understanding and has expressed her wishes to go to [facility] ER instead of [facility] ER. I do not have any information from [facility] ER except for the labs that they rocio on her which I have copied for her to bring with her to the ER. Discussed BMI and healthy weight.  Encouraged patient to work to implement changes including diet high in raw fruits and vegetables, lean protein and good fats. Limit refined, processed carbohydrates and sugar. I have discussed the diagnosis with the patient and the intended plan as seen in the above orders. The patient has received an after-visit summary and questions were answered concerning future plans. I have discussed medication side effects and warnings with the patient as well. The patient verbalizes understanding and agreement with the plan.

## 2020-01-17 NOTE — PATIENT INSTRUCTIONS

## 2020-01-18 NOTE — ED PROVIDER NOTES
EMERGENCY DEPARTMENT HISTORY AND PHYSICAL EXAM      Date: 1/17/2020  Patient Name: Lincoln Reyes  Patient Age and Sex: 81 y.o. female     History of Presenting Illness     Chief Complaint   Patient presents with    Leg Pain     went to ER tuesday for chills and fever; now has 2 to 3 days of left leg swelling and redness       History Provided By: Patient    HPI: Lincoln Reyes is a 62-year-old female presenting with left leg swelling and redness. Patient states that on Tuesday 4 days ago she had a fever and went to [facility] ER. She did not have redness of her left lower extremity or so she thought. When the nurses had asked her if she had redness of her leg in the past, thought she they were talking about eczema but turns out that she was developing a cellulitis there. States that they did chest x-ray, lab work and her white count was 13.9. Urinalysis was negative. She was discharged home and then shortly thereafter started to develop redness of her leg. States that the redness is gotten significantly worse in the left lower extremity with swelling. Today it started to climb past her knee into her distal thigh. Went to see her primary care doctor who sent her here. Denies any leg pain, calf tenderness or pain, shortness of breath, any fevers anymore, nausea or vomiting. States she is not diabetic. Truman Spies There are no other complaints, changes, or physical findings at this time. PCP: Marcia Yoo NP    No current facility-administered medications on file prior to encounter. Current Outpatient Medications on File Prior to Encounter   Medication Sig Dispense Refill    benzonatate (TESSALON) 100 mg capsule       hydroCHLOROthiazide (HYDRODIURIL) 25 mg tablet TAKE 1 TABLET BY MOUTH DAILY 90 Tab 0    cholecalciferol, vitamin D3, (VITAMIN D3) 2,000 unit tab Take  by mouth.  clobetasol (TEMOVATE) 0.05 % ointment Apply  to affected area two (2) times a day.  60 g 3    diclofenac potassium (CATAFLAM) 50 mg tablet Take 50 mg by mouth three (3) times daily.  erythromycin (ILOTYCIN) ophthalmic ointment Apply to eyelid and under eye 2 times per day 3.5 g 0       Past History     Past Medical History:  Past Medical History:   Diagnosis Date    Arthritis     Eczema     Hyperlipidemia     Hypothyroid        Past Surgical History:  Past Surgical History:   Procedure Laterality Date    HX GYN      tubal pregnancy    HX ORTHOPAEDIC  1998    bone spur       Family History:  History reviewed. No pertinent family history. Social History:  Social History     Tobacco Use    Smoking status: Current Every Day Smoker     Packs/day: 1.00     Years: 30.00     Pack years: 30.00     Types: Cigarettes    Smokeless tobacco: Never Used   Substance Use Topics    Alcohol use: No     Alcohol/week: 0.0 standard drinks    Drug use: No       Allergies:  No Known Allergies      Review of Systems   Review of Systems   Constitutional: Negative for chills and fever. Respiratory: Negative for cough and shortness of breath. Cardiovascular: Positive for leg swelling. Negative for chest pain. Gastrointestinal: Negative for abdominal pain, constipation, diarrhea, nausea and vomiting. Skin: Positive for color change, rash and wound. Neurological: Negative for weakness and numbness. All other systems reviewed and are negative. Physical Exam   Physical Exam  Vitals signs and nursing note reviewed. Constitutional:       Appearance: She is well-developed. HENT:      Head: Normocephalic and atraumatic. Eyes:      Conjunctiva/sclera: Conjunctivae normal.   Neck:      Musculoskeletal: Normal range of motion and neck supple. Cardiovascular:      Rate and Rhythm: Normal rate and regular rhythm. Pulmonary:      Effort: Pulmonary effort is normal. No respiratory distress. Breath sounds: Normal breath sounds. Abdominal:      General: There is no distension. Palpations: Abdomen is soft.       Tenderness: There is no tenderness. Musculoskeletal: Normal range of motion. Comments: Patient has significant erythema, warmth and edema of the left lower extremity compared to the right. The erythema is worse over her left shin rather than over the calf. No calf tenderness. Right leg normal.   Skin:     General: Skin is warm and dry. Neurological:      Mental Status: She is alert and oriented to person, place, and time. Diagnostic Study Results     Labs -     Recent Results (from the past 12 hour(s))   CBC WITH AUTOMATED DIFF    Collection Time: 01/17/20  8:29 PM   Result Value Ref Range    WBC 8.5 3.6 - 11.0 K/uL    RBC 4.38 3.80 - 5.20 M/uL    HGB 13.6 11.5 - 16.0 g/dL    HCT 41.4 35.0 - 47.0 %    MCV 94.5 80.0 - 99.0 FL    MCH 31.1 26.0 - 34.0 PG    MCHC 32.9 30.0 - 36.5 g/dL    RDW 13.3 11.5 - 14.5 %    PLATELET 763 (L) 629 - 400 K/uL    MPV 10.2 8.9 - 12.9 FL    NRBC 0.0 0  WBC    ABSOLUTE NRBC 0.00 0.00 - 0.01 K/uL    NEUTROPHILS 77 (H) 32 - 75 %    LYMPHOCYTES 15 12 - 49 %    MONOCYTES 6 5 - 13 %    EOSINOPHILS 2 0 - 7 %    BASOPHILS 0 0 - 1 %    IMMATURE GRANULOCYTES 0 0.0 - 0.5 %    ABS. NEUTROPHILS 6.5 1.8 - 8.0 K/UL    ABS. LYMPHOCYTES 1.3 0.8 - 3.5 K/UL    ABS. MONOCYTES 0.5 0.0 - 1.0 K/UL    ABS. EOSINOPHILS 0.2 0.0 - 0.4 K/UL    ABS. BASOPHILS 0.0 0.0 - 0.1 K/UL    ABS. IMM.  GRANS. 0.0 0.00 - 0.04 K/UL    DF MANUAL      RBC COMMENTS NORMOCYTIC, NORMOCHROMIC     LACTIC ACID    Collection Time: 01/17/20  8:29 PM   Result Value Ref Range    Lactic acid 1.6 0.4 - 2.0 MMOL/L   METABOLIC PANEL, COMPREHENSIVE    Collection Time: 01/17/20  9:09 PM   Result Value Ref Range    Sodium 140 136 - 145 mmol/L    Potassium 4.2 3.5 - 5.1 mmol/L    Chloride 105 97 - 108 mmol/L    CO2 30 21 - 32 mmol/L    Anion gap 5 5 - 15 mmol/L    Glucose 80 65 - 100 mg/dL    BUN 12 6 - 20 MG/DL    Creatinine 0.83 0.55 - 1.02 MG/DL    BUN/Creatinine ratio 14 12 - 20      GFR est AA >60 >60 ml/min/1.73m2    GFR est non-AA >60 >60 ml/min/1.73m2    Calcium 9.1 8.5 - 10.1 MG/DL    Bilirubin, total 0.7 0.2 - 1.0 MG/DL    ALT (SGPT) 20 12 - 78 U/L    AST (SGOT) 24 15 - 37 U/L    Alk. phosphatase 65 45 - 117 U/L    Protein, total 6.8 6.4 - 8.2 g/dL    Albumin 3.0 (L) 3.5 - 5.0 g/dL    Globulin 3.8 2.0 - 4.0 g/dL    A-G Ratio 0.8 (L) 1.1 - 2.2         Radiologic Studies -   No orders to display     CT Results  (Last 48 hours)    None        CXR Results  (Last 48 hours)    None            Medical Decision Making   I am the first provider for this patient. I reviewed the vital signs, available nursing notes, past medical history, past surgical history, family history and social history. Vital Signs-Reviewed the patient's vital signs. Patient Vitals for the past 12 hrs:   Temp Pulse Resp BP SpO2   01/17/20 2150  88 18 132/76 98 %   01/17/20 1857 98.4 °F (36.9 °C) 91 18 (!) 121/97 98 %       Records Reviewed: Nursing Notes and Old Medical Records    Provider Notes (Medical Decision Making):   Patient presenting with significant erythema and warmth and swelling of the left lower extremity. More likely than not due to cellulitis. I have a very low suspicion that this is DVT given her story and the tracking of the leg. Will get IV antibiotics as well as repeat lab work to make sure no signs of severe sepsis or septic shock. She is afebrile with normal heart rate here. ED Course:   Initial assessment performed. The patients presenting problems have been discussed, and they are in agreement with the care plan formulated and outlined with them. I have encouraged them to ask questions as they arise throughout their visit. Critical Care Time:   0    Disposition:  Discharge Note:  The patient has been re-evaluated and is ready for discharge. Reviewed available results with patient. Counseled patient on diagnosis and care plan. Patient has expressed understanding, and all questions have been answered.  Patient agrees with plan and agrees to follow up as recommended, or to return to the ED if their symptoms worsen. Discharge instructions have been provided and explained to the patient, along with reasons to return to the ED. PLAN:  Discharge Medication List as of 1/17/2020  9:43 PM      START taking these medications    Details   cephALEXin (KEFLEX) 500 mg capsule Take 1 Cap by mouth four (4) times daily for 7 days. , Normal, Disp-28 Cap, R-0      doxycycline (VIBRA-TABS) 100 mg tablet Take 1 Tab by mouth two (2) times a day for 10 days. , Normal, Disp-20 Tab, R-0         CONTINUE these medications which have NOT CHANGED    Details   benzonatate (TESSALON) 100 mg capsule Historical Med      hydroCHLOROthiazide (HYDRODIURIL) 25 mg tablet TAKE 1 TABLET BY MOUTH DAILY, Normal, Disp-90 Tab, R-0Generic For:HYDRODIURIL 25 MG TABLET      cholecalciferol, vitamin D3, (VITAMIN D3) 2,000 unit tab Take  by mouth., Historical Med      clobetasol (TEMOVATE) 0.05 % ointment Apply  to affected area two (2) times a day., Normal, Disp-60 g, R-3      diclofenac potassium (CATAFLAM) 50 mg tablet Take 50 mg by mouth three (3) times daily. , Historical Med      erythromycin (ILOTYCIN) ophthalmic ointment Apply to eyelid and under eye 2 times per day, Normal, Disp-3.5 g, R-0           2. Follow-up Information     Follow up With Specialties Details Why Contact Info    Joanne Patel NP Pediatrics Schedule an appointment as soon as possible for a visit For wound re-check [address] [place] [address]  [phone]          3. Return to ED if worse     Diagnosis     Clinical Impression:   1. Cellulitis of left lower extremity        Attestations:    Shilo Jara M.D. Please note that this dictation was completed with [employer], the [employer] voice recognition software. Quite often unanticipated grammatical, syntax, homophones, and other interpretive errors are inadvertently transcribed by the computer software.   Please disregard these errors. Please excuse any errors that have escaped final proofreading. Thank you.

## 2020-01-18 NOTE — DISCHARGE INSTRUCTIONS

## 2020-01-20 ENCOUNTER — TELEPHONE (OUTPATIENT)
Dept: FAMILY MEDICINE CLINIC | Age: 70
End: 2020-01-20

## 2020-01-20 NOTE — TELEPHONE ENCOUNTER
Please call patient and see if there is anything that she needs urgently, I can call her tomorrow otherwise.   I have to leave for appointment today

## 2020-01-21 ENCOUNTER — OFFICE VISIT (OUTPATIENT)
Dept: FAMILY MEDICINE CLINIC | Age: 70
End: 2020-01-21

## 2020-01-21 VITALS
HEIGHT: 69 IN | SYSTOLIC BLOOD PRESSURE: 129 MMHG | HEART RATE: 75 BPM | TEMPERATURE: 98.3 F | RESPIRATION RATE: 16 BRPM | DIASTOLIC BLOOD PRESSURE: 72 MMHG | WEIGHT: 293 LBS | OXYGEN SATURATION: 96 % | BODY MASS INDEX: 43.4 KG/M2

## 2020-01-21 DIAGNOSIS — L03.116 LEFT LEG CELLULITIS: Primary | ICD-10-CM

## 2020-01-21 DIAGNOSIS — R60.0 LOWER EXTREMITY EDEMA: ICD-10-CM

## 2020-01-21 DIAGNOSIS — L30.9 ECZEMA, UNSPECIFIED TYPE: ICD-10-CM

## 2020-01-21 NOTE — PATIENT INSTRUCTIONS
Dermatitis: Care Instructions Your Care Instructions Dermatitis is the general name used for any rash or inflammation of the skin. Different kinds of dermatitis cause different kinds of rashes. Common causes of a rash include new medicines, plants (such as poison oak or poison ivy), heat, and stress. Certain illnesses can also cause a rash. An allergic reaction to something that touches your skin, such as latex, nickel, or poison ivy, is called contact dermatitis. Contact dermatitis may also be caused by something that irritates the skin, such as bleach, a chemical, or soap. These types of rashes cannot be spread from person to person. How long your rash will last depends on what caused it. Rashes may last a few days or months. Follow-up care is a key part of your treatment and safety. Be sure to make and go to all appointments, and call your doctor if you are having problems. It's also a good idea to know your test results and keep a list of the medicines you take. How can you care for yourself at home? · Do not scratch the rash. Cut your nails short, and file them smooth. Or wear gloves if this helps keep you from scratching. · Wash the area with water only. Pat dry. · Put cold, wet cloths on the rash to reduce itching. · Keep cool, and stay out of the sun. · Leave the rash open to the air as much as possible. · If the rash itches, use hydrocortisone cream. Follow the directions on the label. Calamine lotion may help for plant rashes. · Take an over-the-counter antihistamine, such as diphenhydramine (Benadryl) or loratadine (Claritin), to help calm the itching. Read and follow all instructions on the label. · If your doctor prescribed a cream, use it as directed. If your doctor prescribed medicine, take it exactly as directed. When should you call for help? Call your doctor now or seek immediate medical care if: 
  · You have symptoms of infection, such as: ? Increased pain, swelling, warmth, or redness. ? Red streaks leading from the area. ? Pus draining from the area. ? A fever.  
  · You have joint pain along with the rash.  
 Watch closely for changes in your health, and be sure to contact your doctor if: 
  · Your rash is changing or getting worse.  
  · You are not getting better as expected. Where can you learn more? Go to http://ladan-jarrod.info/. Enter (65) [phone] in the search box to learn more about \"Dermatitis: Care Instructions. \" Current as of: April 1, 2019 Content Version: 12.2 © 7147-5712 [employer]. Care instructions adapted under license by [employer] (which disclaims liability or warranty for this information). If you have questions about a medical condition or this instruction, always ask your healthcare professional. [address] any warranty or liability for your use of this information. Cellulitis: Care Instructions Your Care Instructions Cellulitis is a skin infection caused by bacteria, most often strep or staph. It often occurs after a break in the skin from a scrape, cut, bite, or puncture, or after a rash. Cellulitis may be treated without doing tests to find out what caused it. But your doctor may do tests, if needed, to look for a specific bacteria, like methicillin-resistant Staphylococcus aureus (MRSA). The doctor has checked you carefully, but problems can develop later. If you notice any problems or new symptoms, get medical treatment right away. Follow-up care is a key part of your treatment and safety. Be sure to make and go to all appointments, and call your doctor if you are having problems. It's also a good idea to know your test results and keep a list of the medicines you take. How can you care for yourself at home? · Take your antibiotics as directed.  Do not stop taking them just because you feel better. You need to take the full course of antibiotics. · Prop up the infected area on pillows to reduce pain and swelling. Try to keep the area above the level of your heart as often as you can. · If your doctor told you how to care for your wound, follow your doctor's instructions. If you did not get instructions, follow this general advice: 
? Wash the wound with clean water 2 times a day. Don't use hydrogen peroxide or alcohol, which can slow healing. ? You may cover the wound with a thin layer of petroleum jelly, such as Vaseline, and a nonstick bandage. ? Apply more petroleum jelly and replace the bandage as needed. · Be safe with medicines. Take pain medicines exactly as directed. ? If the doctor gave you a prescription medicine for pain, take it as prescribed. ? If you are not taking a prescription pain medicine, ask your doctor if you can take an over-the-counter medicine. To prevent cellulitis in the future · Try to prevent cuts, scrapes, or other injuries to your skin. Cellulitis most often occurs where there is a break in the skin. · If you get a scrape, cut, mild burn, or bite, wash the wound with clean water as soon as you can to help avoid infection. Don't use hydrogen peroxide or alcohol, which can slow healing. · If you have swelling in your legs (edema), support stockings and good skin care may help prevent leg sores and cellulitis. · Take care of your feet, especially if you have diabetes or other conditions that increase the risk of infection. Wear shoes and socks. Do not go barefoot. If you have athlete's foot or other skin problems on your feet, talk to your doctor about how to treat them. When should you call for help? Call your doctor now or seek immediate medical care if: 
  · You have signs that your infection is getting worse, such as: 
? Increased pain, swelling, warmth, or redness. ? Red streaks leading from the area. ? Pus draining from the area. ? A fever.   · You get a rash.  
 Watch closely for changes in your health, and be sure to contact your doctor if: 
  · You do not get better as expected. Where can you learn more? Go to http://ladan-jarrod.info/. Melvin Seymour in the search box to learn more about \"Cellulitis: Care Instructions. \" Current as of: April 1, 2019 Content Version: 12.2 © 4706-9763 [employer]. Care instructions adapted under license by [employer] (which disclaims liability or warranty for this information). If you have questions about a medical condition or this instruction, always ask your healthcare professional. [address] any warranty or liability for your use of this information.

## 2020-01-21 NOTE — PROGRESS NOTES
Chief Complaint   Patient presents with    Skin Problem     f/u     1. Have you been to the ER, urgent care clinic since your last visit? Hospitalized since your last visit? Yes When: Pt went to [facility] for eval    2. Have you seen or consulted any other health care providers outside of the [facility] since your last visit? Include any pap smears or colon screening. No    Health maintenance reviewed. Pt informed of health maintenance past due and/or upcoming. Pt verbalized understanding.      Health Maintenance Due   Topic Date Due    GLAUCOMA SCREENING Q2Y  04/08/2017    FOBT Q 1 YEAR AGE 50-75  09/27/2018

## 2020-01-21 NOTE — LETTER
NOTIFICATION RETURN TO WORK / SCHOOL 
 
1/21/2020 12:05 PM 
 
Ms. Arnulfo Walton [address] [address] To Whom It May Concern: 
 
Arnulfo Walton is currently under the care of [address] [place] [address]. She will return to work/school on: 1/28/2020 If there are questions or concerns please have the patient contact our office. Sincerely, Rah Allison NP

## 2020-01-21 NOTE — PROGRESS NOTES
Subjective:     Chief Complaint   Patient presents with    Skin Problem     f/u        HPI:  81 y.o.  presents for follow up appointment. I saw her in the office on Friday, 1/17/2020 for complaints of left lower leg redness and swelling. [facility] for complaints of sudden onset of fever and chills and diagnosed with dehydration, at that time she did not have any redness in her leg. She came in to see me late afternoon Friday with left lower leg swelling and erythema, I sent her back to ER same day for further evaluation, she would need repeat labs and possible ultrasound to rule out DVT secondary to unilateral swelling. The ER repeated labs but did not feel the need to do any imaging on her leg. Her white blood cell count actually improved when she was in the ER on Friday from her previous reading on Tuesday. The emergency room gave her IV antibiotics for 1 dose and discharged her on  Doxycycline and keflex. Today her leg is looking much better, still a little red but improving (no redness to her thigh anymore) and swelling is still present but also improving. She does report some pain to palpation of her anterior and medial shin, also sore if standing on it or if water hits her leg. She is feeling a little tired and light headed at times. No falls, no fever. No N/V/D. She is trying to eat better and hydrate well. She notes that she did not have an appetite for the first few days but is eating well now. No hospital, ER or specialist visits since last primary care visit except as noted above.     Past Medical History:   Diagnosis Date    Arthritis     Eczema     Hyperlipidemia     Hypothyroid        Social History     Tobacco Use    Smoking status: Current Every Day Smoker     Packs/day: 1.00     Years: 30.00     Pack years: 30.00     Types: Cigarettes    Smokeless tobacco: Never Used   Substance Use Topics    Alcohol use: No     Alcohol/week: 0.0 standard drinks    Drug use: No       Outpatient Medications Marked as Taking for the 1/21/20 encounter (Office Visit) with Joanne Patel NP   Medication Sig Dispense Refill    benzonatate (TESSALON) 100 mg capsule       cephALEXin (KEFLEX) 500 mg capsule Take 1 Cap by mouth four (4) times daily for 7 days. 28 Cap 0    doxycycline (VIBRA-TABS) 100 mg tablet Take 1 Tab by mouth two (2) times a day for 10 days. 20 Tab 0    hydroCHLOROthiazide (HYDRODIURIL) 25 mg tablet TAKE 1 TABLET BY MOUTH DAILY 90 Tab 0    cholecalciferol, vitamin D3, (VITAMIN D3) 2,000 unit tab Take  by mouth.  clobetasol (TEMOVATE) 0.05 % ointment Apply  to affected area two (2) times a day. 60 g 3    diclofenac potassium (CATAFLAM) 50 mg tablet Take 50 mg by mouth three (3) times daily.  erythromycin (ILOTYCIN) ophthalmic ointment Apply to eyelid and under eye 2 times per day 3.5 g 0       No Known Allergies    Health Maintenance reviewed       ROS:  Gen: +fatigue, no fever, no chills, no unexplained weight loss or weight gain  Eyes: no excessive tearing, itching, or discharge  Nose: no rhinorrhea, no sinus pain  Mouth: no oral lesions, no sore throat, no difficulty swallowing  Resp: no shortness of breath, no wheezing, no cough  CV: no chest pain, no orthopnea, no paroxysmal nocturnal dyspnea, no lower extremity edema, no palpitations  Abd: no nausea, no heartburn, no diarrhea, no constipation, no abdominal pain  Neuro: no headaches, no syncope or presyncopal episodes, intermittent lightheaded feeling   Endo: no polyuria, no polydipsia.     : no hematuria, no dysuria, no frequency, no incontinence  Heme: no lymphadenopathy, no easy bruising or bleeding, no night sweats    PE:  Visit Vitals  /72 (BP 1 Location: Left arm, BP Patient Position: Sitting)   Pulse 75   Temp 98.3 °F (36.8 °C) (Oral)   Resp 16   Ht 5' 9\" (1.753 m)   Wt 338 lb (153.3 kg)   LMP  (LMP Unknown)   SpO2 96% BMI 49.91 kg/m²     Gen: alert, oriented, no acute distress  Head: normocephalic, atraumatic  Oral: moist mucus membranes, no oral lesions, no pharyngeal inflammation or exudate  Neck: symmetric normal sized thyroid, no carotid bruits, no jugular vein distention  Resp: no increase work of breathing, lungs clear to ausculation bilaterally, no wheezing, rales or rhonchi  CV: S1, S2 normal.  No murmurs, rubs, or gallops. Abd: soft, not tender, not distended. No hepatosplenomegaly. Normal bowel sounds. No hernias. No abdominal or renal bruits. Neuro: cranial nerves intact, normal strength and movement in all extremities, and sensation intact and symmetric. Skin: no lesion or rash  Extremities: Right leg no cyanosis or edema  Left leg: Improving erythema to the left lower anterior and posterior leg. Eczema plaques to the medial posterior calf appear to be improving as well. Foot and ankle and lower leg still with swelling but much improved from Friday. No results found for this visit on 01/21/20. Assessment/Plan:  Differential diagnosis and treatment options reviewed with patient who is in agreement with treatment plan as outlined below. ICD-10-CM ICD-9-CM    1. Left leg cellulitis L03.116 682.6 REFERRAL TO DERMATOLOGY   2. Lower extremity edema R60.0 782.3    3. Eczema, unspecified type L30.9 692.9 REFERRAL TO DERMATOLOGY     Cellulitis appears to be improving, continue current antibiotics. Continue to work on hydration and small frequent meals. Elevate leg when possible. I like to see her back in 1 week, I taken out of work until then. I would like for her to cut her current dose of hydrochlorothiazide in half for now, lightheaded could be from dehydration/resolving infection. I would like for her to monitor her blood pressure at home and follow a low-salt diet. She will call or go to the emergency room if any fever or worsening signs of infection.   Given chronic complexity of eczema, I have finally convinced her that she needs to be seen by dermatology for better eczema control and to actually confirm that this is eczema and not some other skin process that is not being effectively treated. This cellulitis could have been caused by eczema flare. She is in agreement, referral given. Discussed BMI and healthy weight. Encouraged patient to work to implement changes including diet high in raw fruits and vegetables, lean protein and good fats. Limit refined, processed carbohydrates and sugar. I have discussed the diagnosis with the patient and the intended plan as seen in the above orders. The patient has received an after-visit summary and questions were answered concerning future plans. I have discussed medication side effects and warnings with the patient as well. The patient verbalizes understanding and agreement with the plan.

## 2020-01-27 ENCOUNTER — OFFICE VISIT (OUTPATIENT)
Dept: FAMILY MEDICINE CLINIC | Age: 70
End: 2020-01-27

## 2020-01-27 VITALS
DIASTOLIC BLOOD PRESSURE: 81 MMHG | OXYGEN SATURATION: 97 % | HEART RATE: 77 BPM | SYSTOLIC BLOOD PRESSURE: 142 MMHG | BODY MASS INDEX: 43.4 KG/M2 | WEIGHT: 293 LBS | HEIGHT: 69 IN | TEMPERATURE: 98.4 F | RESPIRATION RATE: 16 BRPM

## 2020-01-27 DIAGNOSIS — I10 ESSENTIAL HYPERTENSION: ICD-10-CM

## 2020-01-27 DIAGNOSIS — M79.89 LEFT LEG SWELLING: ICD-10-CM

## 2020-01-27 DIAGNOSIS — L03.116 LEFT LEG CELLULITIS: Primary | ICD-10-CM

## 2020-01-27 NOTE — PATIENT INSTRUCTIONS
Cellulitis: Care Instructions Your Care Instructions Cellulitis is a skin infection caused by bacteria, most often strep or staph. It often occurs after a break in the skin from a scrape, cut, bite, or puncture, or after a rash. Cellulitis may be treated without doing tests to find out what caused it. But your doctor may do tests, if needed, to look for a specific bacteria, like methicillin-resistant Staphylococcus aureus (MRSA). The doctor has checked you carefully, but problems can develop later. If you notice any problems or new symptoms, get medical treatment right away. Follow-up care is a key part of your treatment and safety. Be sure to make and go to all appointments, and call your doctor if you are having problems. It's also a good idea to know your test results and keep a list of the medicines you take. How can you care for yourself at home? · Take your antibiotics as directed. Do not stop taking them just because you feel better. You need to take the full course of antibiotics. · Prop up the infected area on pillows to reduce pain and swelling. Try to keep the area above the level of your heart as often as you can. · If your doctor told you how to care for your wound, follow your doctor's instructions. If you did not get instructions, follow this general advice: 
? Wash the wound with clean water 2 times a day. Don't use hydrogen peroxide or alcohol, which can slow healing. ? You may cover the wound with a thin layer of petroleum jelly, such as Vaseline, and a nonstick bandage. ? Apply more petroleum jelly and replace the bandage as needed. · Be safe with medicines. Take pain medicines exactly as directed. ? If the doctor gave you a prescription medicine for pain, take it as prescribed. ? If you are not taking a prescription pain medicine, ask your doctor if you can take an over-the-counter medicine. To prevent cellulitis in the future · Try to prevent cuts, scrapes, or other injuries to your skin. Cellulitis most often occurs where there is a break in the skin. · If you get a scrape, cut, mild burn, or bite, wash the wound with clean water as soon as you can to help avoid infection. Don't use hydrogen peroxide or alcohol, which can slow healing. · If you have swelling in your legs (edema), support stockings and good skin care may help prevent leg sores and cellulitis. · Take care of your feet, especially if you have diabetes or other conditions that increase the risk of infection. Wear shoes and socks. Do not go barefoot. If you have athlete's foot or other skin problems on your feet, talk to your doctor about how to treat them. When should you call for help? Call your doctor now or seek immediate medical care if: 
  · You have signs that your infection is getting worse, such as: 
? Increased pain, swelling, warmth, or redness. ? Red streaks leading from the area. ? Pus draining from the area. ? A fever.  
  · You get a rash.  
 Watch closely for changes in your health, and be sure to contact your doctor if: 
  · You do not get better as expected. Where can you learn more? Go to http://ladan-jarrod.info/. Vikram Pierson in the search box to learn more about \"Cellulitis: Care Instructions. \" Current as of: April 1, 2019 Content Version: 12.2 © 3728-3969 [employer]. Care instructions adapted under license by [employer] (which disclaims liability or warranty for this information). If you have questions about a medical condition or this instruction, always ask your healthcare professional. [address] any warranty or liability for your use of this information. Dermatitis: Care Instructions Your Care Instructions Dermatitis is the general name used for any rash or inflammation of the skin. Different kinds of dermatitis cause different kinds of rashes. Common causes of a rash include new medicines, plants (such as poison oak or poison ivy), heat, and stress. Certain illnesses can also cause a rash. An allergic reaction to something that touches your skin, such as latex, nickel, or poison ivy, is called contact dermatitis. Contact dermatitis may also be caused by something that irritates the skin, such as bleach, a chemical, or soap. These types of rashes cannot be spread from person to person. How long your rash will last depends on what caused it. Rashes may last a few days or months. Follow-up care is a key part of your treatment and safety. Be sure to make and go to all appointments, and call your doctor if you are having problems. It's also a good idea to know your test results and keep a list of the medicines you take. How can you care for yourself at home? · Do not scratch the rash. Cut your nails short, and file them smooth. Or wear gloves if this helps keep you from scratching. · Wash the area with water only. Pat dry. · Put cold, wet cloths on the rash to reduce itching. · Keep cool, and stay out of the sun. · Leave the rash open to the air as much as possible. · If the rash itches, use hydrocortisone cream. Follow the directions on the label. Calamine lotion may help for plant rashes. · Take an over-the-counter antihistamine, such as diphenhydramine (Benadryl) or loratadine (Claritin), to help calm the itching. Read and follow all instructions on the label. · If your doctor prescribed a cream, use it as directed. If your doctor prescribed medicine, take it exactly as directed. When should you call for help? Call your doctor now or seek immediate medical care if: 
  · You have symptoms of infection, such as: 
? Increased pain, swelling, warmth, or redness. ? Red streaks leading from the area. ? Pus draining from the area. ? A fever.  
  · You have joint pain along with the rash.  Watch closely for changes in your health, and be sure to contact your doctor if: 
  · Your rash is changing or getting worse.  
  · You are not getting better as expected. Where can you learn more? Go to http://ladan-jarrod.info/. Enter [phone] in the search box to learn more about \"Dermatitis: Care Instructions. \" Current as of: April 1, 2019 Content Version: 12.2 © 8759-2977 [employer]. Care instructions adapted under license by [employer] (which disclaims liability or warranty for this information). If you have questions about a medical condition or this instruction, always ask your healthcare professional. [place] [address] any warranty or liability for your use of this information.

## 2020-01-27 NOTE — PROGRESS NOTES
Chief Complaint   Patient presents with    Skin Infection     f/u     1. Have you been to the ER, urgent care clinic since your last visit? Hospitalized since your last visit? No    2. Have you seen or consulted any other health care providers outside of the [address] [place] [address] since your last visit? Include any pap smears or colon screening. No    Health maintenance reviewed. Pt informed of health maintenance past due and/or upcoming. Pt verbalized understanding.      Health Maintenance Due   Topic Date Due    GLAUCOMA SCREENING Q2Y  04/08/2017    FOBT Q 1 YEAR AGE 50-75  09/27/2018

## 2020-01-27 NOTE — LETTER
NOTIFICATION RETURN TO WORK / SCHOOL 
 
1/27/2020 11:44 AM 
 
Ms. Felipa Dandy 44 [address] [id number] To Whom It May Concern: 
 
Felipa Dandy is currently under the care of [address] [place] [address]. She will return to work/school on: 1/28/2020 If there are questions or concerns please have the patient contact our office. Sincerely, Xenia Mayorga NP

## 2020-01-27 NOTE — PROGRESS NOTES
Subjective:     Chief Complaint   Patient presents with    Skin Infection     f/u        HPI:  81 y.o.  presents for follow up appointment for left leg cellulitis. .     Cut HCTZ dose in half last week to see if that would help with her feeling a little light headed. Admits that she is not drinking enough water. Leg looks much better, slight swelling to foot and great toe but redness and swelling is so much better. Did not take HCTZ today. Has not been checking BP at home. Has dermatology appointment next week. No hospital, ER or specialist visits since last primary care visit except as noted above. Past Medical History:   Diagnosis Date    Arthritis     Eczema     Hyperlipidemia     Hypothyroid        Social History     Tobacco Use    Smoking status: Current Every Day Smoker     Packs/day: 1.00     Years: 30.00     Pack years: 30.00     Types: Cigarettes    Smokeless tobacco: Never Used   Substance Use Topics    Alcohol use: No     Alcohol/week: 0.0 standard drinks    Drug use: No       Outpatient Medications Marked as Taking for the 1/27/20 encounter (Office Visit) with Kimberlee Juan NP   Medication Sig Dispense Refill    benzonatate (TESSALON) 100 mg capsule       doxycycline (VIBRA-TABS) 100 mg tablet Take 1 Tab by mouth two (2) times a day for 10 days. 20 Tab 0    hydroCHLOROthiazide (HYDRODIURIL) 25 mg tablet TAKE 1 TABLET BY MOUTH DAILY 90 Tab 0    cholecalciferol, vitamin D3, (VITAMIN D3) 2,000 unit tab Take  by mouth.  clobetasol (TEMOVATE) 0.05 % ointment Apply  to affected area two (2) times a day. 60 g 3    diclofenac potassium (CATAFLAM) 50 mg tablet Take 50 mg by mouth three (3) times daily.       erythromycin (ILOTYCIN) ophthalmic ointment Apply to eyelid and under eye 2 times per day 3.5 g 0       No Known Allergies    Health Maintenance reviewed      ROS:  Gen: no fatigue, no fever, no chills, no unexplained weight loss or weight gain  Eyes: no excessive tearing, itching, or discharge  Nose: no rhinorrhea, no sinus pain  Mouth: no oral lesions, no sore throat, no difficulty swallowing  Resp: no shortness of breath, no wheezing, no cough  CV: no chest pain, no orthopnea, no paroxysmal nocturnal dyspnea, no lower extremity edema, no palpitations  Abd: no nausea, no heartburn, no diarrhea, no constipation, no abdominal pain  Neuro: no headaches, no syncope or presyncopal episodes  Endo: no polyuria, no polydipsia. : no hematuria, no dysuria, no frequency, no incontinence  Heme: no lymphadenopathy, no easy bruising or bleeding, no night sweats  MSK: no joint pain or swelling     PE:  Visit Vitals  /81 (BP 1 Location: Left arm, BP Patient Position: Sitting)   Pulse 77   Temp 98.4 °F (36.9 °C) (Oral)   Resp 16   Ht 5' 9\" (1.753 m)   Wt 335 lb (152 kg)   LMP  (LMP Unknown)   SpO2 97%   BMI 49.47 kg/m²     Gen: alert, oriented, no acute distress  Head: normocephalic, atraumatic  Ears: external auditory canals clear, TMs without erythema or effusion  Eyes: pupils equal round reactive to light, sclera clear, conjunctiva clear  Oral: moist mucus membranes, no oral lesions, no pharyngeal inflammation or exudate  Neck: symmetric normal sized thyroid, no carotid bruits, no jugular vein distention  Resp: no increase work of breathing, lungs clear to ausculation bilaterally, no wheezing, rales or rhonchi  CV: S1, S2 normal.  No murmurs, rubs, or gallops. Abd: soft, not tender, not distended. No hepatosplenomegaly. Normal bowel sounds. No hernias. No abdominal or renal bruits. Neuro: cranial nerves intact, normal strength and movement in all extremities, reflexes and sensation intact and symmetric. Skin: eczema to lower legs. Erythema minimal/ much improved to left lower leg  Extremities: no cyanosis. Trace swelling to left foot. No results found for this visit on 01/27/20.     Assessment/Plan:  Differential diagnosis and treatment options reviewed with patient who is in agreement with treatment plan as outlined below. ICD-10-CM ICD-9-CM    1. Left leg cellulitis L03.116 682.6    2. Essential hypertension I10 401.9    3. Left leg swelling M79.89 729.81      BP a little elevated, did not take her HCTZ today. Will monitor BP at home and call if elevated. Increase water intake. See dermatology next week. Cellulitis almost completely resolved. May return to work tomorrow. Discussed BMI and healthy weight. Encouraged patient to work to implement changes including diet high in raw fruits and vegetables, lean protein and good fats. Limit refined, processed carbohydrates and sugar. Encouraged regular exercise. Recommended regular cardiovascular exercise 3-6 times per week for 30-60 minutes daily. I have discussed the diagnosis with the patient and the intended plan as seen in the above orders. The patient has received an after-visit summary and questions were answered concerning future plans. I have discussed medication side effects and warnings with the patient as well. The patient verbalizes understanding and agreement with the plan.

## 2020-03-10 DIAGNOSIS — I10 ESSENTIAL HYPERTENSION: ICD-10-CM

## 2020-03-10 DIAGNOSIS — R60.0 LOWER EXTREMITY EDEMA: ICD-10-CM

## 2020-03-10 RX ORDER — HYDROCHLOROTHIAZIDE 25 MG/1
TABLET ORAL
Qty: 90 TAB | Refills: 0 | Status: SHIPPED | OUTPATIENT
Start: 2020-03-10 | End: 2020-06-08 | Stop reason: SDUPTHER

## 2020-05-28 RX ORDER — ERGOCALCIFEROL 1.25 MG/1
CAPSULE ORAL
Qty: 4 CAP | Refills: 2 | Status: SHIPPED | OUTPATIENT
Start: 2020-05-28 | End: 2020-09-02 | Stop reason: SDUPTHER

## 2020-06-08 DIAGNOSIS — R60.0 LOWER EXTREMITY EDEMA: ICD-10-CM

## 2020-06-08 DIAGNOSIS — I10 ESSENTIAL HYPERTENSION: ICD-10-CM

## 2020-06-08 RX ORDER — HYDROCHLOROTHIAZIDE 25 MG/1
TABLET ORAL
Qty: 90 TAB | Refills: 0 | Status: SHIPPED | OUTPATIENT
Start: 2020-06-08

## 2020-06-08 NOTE — TELEPHONE ENCOUNTER
Requested Prescriptions     Pending Prescriptions Disp Refills    hydroCHLOROthiazide (HYDRODIURIL) 25 mg tablet 90 Tab 0     Sig: TAKE 1 TABLET BY MOUTH DAILY

## 2020-07-10 ENCOUNTER — VIRTUAL VISIT (OUTPATIENT)
Dept: FAMILY MEDICINE CLINIC | Age: 70
End: 2020-07-10

## 2020-07-10 DIAGNOSIS — E55.9 VITAMIN D DEFICIENCY: ICD-10-CM

## 2020-07-10 DIAGNOSIS — Z13.29 SCREENING FOR THYROID DISORDER: ICD-10-CM

## 2020-07-10 DIAGNOSIS — I10 ESSENTIAL HYPERTENSION: Primary | ICD-10-CM

## 2020-07-10 DIAGNOSIS — E78.2 MIXED HYPERLIPIDEMIA: ICD-10-CM

## 2020-07-10 DIAGNOSIS — E66.01 OBESITY, CLASS III, BMI 40-49.9 (MORBID OBESITY) (HCC): ICD-10-CM

## 2020-07-10 NOTE — TELEPHONE ENCOUNTER
Pt is calling wanting to speak with Alfa Rodriguez in ref to her last visit she can be reached at home # [[email or web address]] : Continue cow's milk. Continue table foods, 3 meals with 2-3 snacks per day. Incorporate flourinated water daily in a sippy cup. Brush teeth twice a day with soft toothbrush. Recommend visit to dentist. When in car, keep child in rear-facing car seats until age 2, or until  the maximum height and weight for seat is reached. Put toddler to sleep in own bed. Help toddler to maintain consistent daily routines and sleep schedule. Toilet training discussed. Ensure home is safe. Use consistent, positive discipline. Read aloud to toddler. Limit screen time to no more than 2 hours per day.\par

## 2020-07-10 NOTE — PROGRESS NOTES
Chief Complaint   Patient presents with    Thyroid Problem     follow up       1. Have you been to the ER, urgent care clinic since your last visit? Hospitalized since your last visit? No    2. Have you seen or consulted any other health care providers outside of the [address] [place] [address] since your last visit? Include any pap smears or colon screening.  No    Health Maintenance Due   Topic Date Due    Shingrix Vaccine Age 49> (1 of 2) 04/08/2002    GLAUCOMA SCREENING Q2Y  04/08/2017    FOBT Q1Y Age 50-75  09/27/2018

## 2020-07-10 NOTE — PROGRESS NOTES
Chief Complaint   Patient presents with    Hypertension     follow up     12  Subjective:   Brooks Scott is a 78 y.o. female, evaluated via audio-only technology on 7/10/2020 for HTN follow up. BP at home, \"staying pretty steady, really good when I remember to take the medicine\". 120-140/80s. She took her BP today and noted that it was 152/83 but has not taken her medicine and was up walking around a bunch. She has not taken her HCTZ for the past three days. Not having any dizziness. Denies any CP,sweling, KRAMER or palpitations. Feeling really good. No complaints. Says that her legs look really good, no wounds or rashes. Declines mammogram.  Declines dexa scan. Prior to Admission medications    Medication Sig Start Date End Date Taking? Authorizing Provider   hydroCHLOROthiazide (HYDRODIURIL) 25 mg tablet TAKE 1 TABLET BY MOUTH DAILY 6/8/20  Yes Kimberlee Juan NP   ergocalciferol (ERGOCALCIFEROL) 1,250 mcg (50,000 unit) capsule TAKE 1 CAPSULE BY MOUTH ONCE EVERY 7 DAYS 5/28/20  Yes Kimberlee Juan NP   benzonatate (TESSALON) 100 mg capsule  1/3/20  Yes Provider, Historical   clobetasol (TEMOVATE) 0.05 % ointment Apply  to affected area two (2) times a day. 5/16/19  Yes Kimberlee Juan NP   diclofenac potassium (CATAFLAM) 50 mg tablet Take 50 mg by mouth three (3) times daily. Yes Provider, Historical   cholecalciferol, vitamin D3, (VITAMIN D3) 2,000 unit tab Take  by mouth.     Provider, Historical   erythromycin (ILOTYCIN) ophthalmic ointment Apply to eyelid and under eye 2 times per day 9/11/18   Marie Juan NP     Patient Active Problem List    Diagnosis Date Noted    Hyperlipidemia 02/25/2014    Obesity, Class III, BMI 40-49.9 (morbid obesity) ([facility] [address] 02/25/2014    Hypothyroid 03/27/2013     Past Medical History:   Diagnosis Date    Arthritis     Eczema     Hyperlipidemia     Hypothyroid      Past Surgical History:   Procedure Laterality Date    HX GYN      tubal pregnancy  HX ORTHOPAEDIC  1998    bone spur       ROS  Gen: no fatigue, fever, chills  Eyes: no excessive tearing, itching, or discharge  Nose: no rhinorrhea, no sinus pain  Mouth: no oral lesions, no sore throat  Resp: no shortness of breath, no wheezing, no cough  CV: no chest pain, no paroxysmal nocturnal dyspnea  Abd: no nausea, no heartburn, no diarrhea, no constipation, no abdominal pain  Neuro: no headaches, no syncope or presyncopal episodes  Endo: no polyuria, no polydipsia  Heme: no lymphadenopathy, no easy bruising or bleeding    Patient-Reported Vitals 7/10/2020   Patient-Reported Weight 338lb   Patient-Reported Height 5f   Patient-Reported Temperature -   Patient-Reported Systolic  -   Patient-Reported Diastolic -          Assessment & Plan:   Diagnoses and all orders for this visit:    1. Essential hypertension  -     METABOLIC PANEL, COMPREHENSIVE; Future  -     CBC WITH AUTOMATED DIFF; Future    2. Obesity, Class III, BMI 40-49.9 (morbid obesity) ([facility] [address]    3. Vitamin D deficiency  -     VITAMIN D, 25 HYDROXY; Future    4. Mixed hyperlipidemia  -     LIPID PANEL; Future    5. Screening for thyroid disorder  -     TSH 3RD GENERATION; Future      Declines getting labs done at [address] [place] [address].  Will have her come into office for follow up in 2-3 months. Will give her FIT test then   Discussed BMI and healthy weight. Encouraged patient to work to implement changes including diet high in raw fruits and vegetables, lean protein and good fats. Limit refined, processed carbohydrates and sugar. Encouraged regular exercise. Alexis Robles, who was evaluated through a patient-initiated, synchronous (real-time) audio only encounter, and/or her healthcare decision maker, is aware that it is a billable service, with coverage as determined by her insurance carrier. She provided verbal consent to proceed: Yes.  She has not had a related appointment within my department in the past 7 days or scheduled within the next 24 hours.       Total Time: minutes: 21-30 minutes    Monique Rivas NP

## 2020-09-02 RX ORDER — ERGOCALCIFEROL 1.25 MG/1
CAPSULE ORAL
Qty: 4 CAP | Refills: 2 | Status: SHIPPED | OUTPATIENT
Start: 2020-09-02 | End: 2020-10-08 | Stop reason: ALTCHOICE

## 2020-09-02 NOTE — TELEPHONE ENCOUNTER
Flory is requesting a refill on med    Requested Prescriptions     Pending Prescriptions Disp Refills    ergocalciferol (ERGOCALCIFEROL) 1,250 mcg (50,000 unit) capsule 4 Cap 2

## 2020-10-08 ENCOUNTER — OFFICE VISIT (OUTPATIENT)
Dept: FAMILY MEDICINE CLINIC | Age: 70
End: 2020-10-08
Payer: COMMERCIAL

## 2020-10-08 VITALS
WEIGHT: 293 LBS | HEART RATE: 84 BPM | BODY MASS INDEX: 43.4 KG/M2 | SYSTOLIC BLOOD PRESSURE: 117 MMHG | HEIGHT: 69 IN | RESPIRATION RATE: 16 BRPM | TEMPERATURE: 96.3 F | OXYGEN SATURATION: 97 % | DIASTOLIC BLOOD PRESSURE: 73 MMHG

## 2020-10-08 DIAGNOSIS — Z23 ENCOUNTER FOR IMMUNIZATION: ICD-10-CM

## 2020-10-08 DIAGNOSIS — F32.9 REACTIVE DEPRESSION: ICD-10-CM

## 2020-10-08 DIAGNOSIS — E55.9 VITAMIN D DEFICIENCY: ICD-10-CM

## 2020-10-08 DIAGNOSIS — I10 ESSENTIAL HYPERTENSION: Primary | ICD-10-CM

## 2020-10-08 DIAGNOSIS — E78.2 MIXED HYPERLIPIDEMIA: ICD-10-CM

## 2020-10-08 DIAGNOSIS — Z13.29 SCREENING FOR THYROID DISORDER: ICD-10-CM

## 2020-10-08 DIAGNOSIS — Z12.11 SPECIAL SCREENING FOR MALIGNANT NEOPLASMS, COLON: ICD-10-CM

## 2020-10-08 DIAGNOSIS — G47.00 INSOMNIA, UNSPECIFIED TYPE: ICD-10-CM

## 2020-10-08 PROCEDURE — 90732 PPSV23 VACC 2 YRS+ SUBQ/IM: CPT | Performed by: NURSE PRACTITIONER

## 2020-10-08 PROCEDURE — 90471 IMMUNIZATION ADMIN: CPT | Performed by: NURSE PRACTITIONER

## 2020-10-08 PROCEDURE — 99214 OFFICE O/P EST MOD 30 MIN: CPT | Performed by: NURSE PRACTITIONER

## 2020-10-08 RX ORDER — AMOXICILLIN AND CLAVULANATE POTASSIUM 875; 125 MG/1; MG/1
1 TABLET, FILM COATED ORAL EVERY 12 HOURS
Qty: 20 TAB | Refills: 0 | Status: SHIPPED | OUTPATIENT
Start: 2020-10-08 | End: 2020-12-15 | Stop reason: SDUPTHER

## 2020-10-08 RX ORDER — TRAZODONE HYDROCHLORIDE 50 MG/1
50 TABLET ORAL
Qty: 30 TAB | Refills: 1 | Status: SHIPPED | OUTPATIENT
Start: 2020-10-08

## 2020-10-08 NOTE — PROGRESS NOTES
Subjective:      Chief Complaint   Patient presents with    Hypertension       Edyta Chan is a 78 y.o. female with history of hypertension, here for follow up. Hypertension ROS: taking medications as instructed, no medication side effects noted, no TIA's, no chest pain on exertion, no dyspnea on exertion, no swelling of ankles,headaches, shortness of breath, vision change. she generally follows a low fat low cholesterol diet, generally follows a low sodium diet. Feeling tired and does not feel like doing anything, attributes that to Covid. \"I dont want to say I am depressed but I am definitely down and feeling not myself\"   Says she has had difficulty sleeping as well, has tried sleep aide without any relief. Denies SI/HI  She denies anxiety or panic attacks. No recent hospitalizations since last visit. Past Medical History:   Diagnosis Date    Arthritis     Eczema     Hyperlipidemia     Hypothyroid      Past Surgical History:   Procedure Laterality Date    HX GYN      tubal pregnancy    HX ORTHOPAEDIC  1998    bone spur     Social History     Tobacco Use    Smoking status: Current Every Day Smoker     Packs/day: 1.00     Years: 30.00     Pack years: 30.00     Types: Cigarettes    Smokeless tobacco: Never Used   Substance Use Topics    Alcohol use: No     Alcohol/week: 0.0 standard drinks     family history is not on file. Current Outpatient Medications on File Prior to Visit   Medication Sig    hydroCHLOROthiazide (HYDRODIURIL) 25 mg tablet TAKE 1 TABLET BY MOUTH DAILY    cholecalciferol, vitamin D3, (VITAMIN D3) 2,000 unit tab Take  by mouth.  clobetasol (TEMOVATE) 0.05 % ointment Apply  to affected area two (2) times a day.  diclofenac potassium (CATAFLAM) 50 mg tablet Take 50 mg by mouth three (3) times daily.     ergocalciferol (ERGOCALCIFEROL) 1,250 mcg (50,000 unit) capsule TAKE 1 CAPSULE BY MOUTH ONCE EVERY 7 DAYS     No current facility-administered medications on file prior to visit. No Known Allergies    ROS:   History obtained from the patient   Neurological: no paresthesias, weakness, or dizziness  GEN: no weight loss, weight gain, fatigue or night sweats  CV: no PND, orthopnea, or palpitations, no chest pain  Resp: no dyspnea on exertion, no cough  Abd: no nausea, vomiting or diarrhea, no bloody or black stools  Endocrine: no hair loss, excessive thirst or polyuria    Nurses note reviewed    Objective:     Visit Vitals  /73 (BP 1 Location: Right arm, BP Patient Position: Sitting)   Pulse 84   Temp (!) 96.3 °F (35.7 °C) (Temporal)   Resp 16   Ht 5' 9\" (1.753 m)   Wt 338 lb 6.4 oz (153.5 kg)   LMP  (LMP Unknown)   SpO2 97%   BMI 49.97 kg/m²     General:   alert, cooperative and no distress   Eyes: conjunctivae/sclerae clear. PERRL, EOM's intact   Ears: External auditory canals clear, tympanic membranes clear   Sinuses/Nose: No maxillary or frontal tenderness. Mouth:  No oral lesions, no pharyngeal erythema, no exudates   Neck: Trachea midline, no thyromegaly, no bruits   Heart: S1 and S2 normal,no murmurs noted    Lungs: Clear to auscultation bilaterally, no increased work of breathing   Abdomen: Soft, nontender. Normal bowel sounds   Extremities: No edema or cyanosis           Assessment/Plan:   Differential diagnosis and treatment options reviewed with patient who is in agreement with treatment plan as outlined below. ICD-10-CM ICD-9-CM    1. Essential hypertension  C97 525.0 METABOLIC PANEL, COMPREHENSIVE      CBC WITH AUTOMATED DIFF   2. Vitamin D deficiency  E55.9 268.9 VITAMIN D, 25 HYDROXY   3. Mixed hyperlipidemia  E78.2 272.2 LIPID PANEL   4. Screening for thyroid disorder  Z13.29 V77.0 TSH 3RD GENERATION   5. Special screening for malignant neoplasms, colon  Z12.11 V76.51 OCCULT BLOOD IMMUNOASSAY,DIAGNOSTIC   6. Reactive depression  F32.9 300.4 traZODone (DESYREL) 50 mg tablet   7.  Insomnia, unspecified type  G47.00 780.52 traZODone (DESYREL) 50 mg tablet   8. Encounter for immunization  Z23 V03.89 PNEUMOCOCCAL POLYSACCHARIDE VACCINE, 23-VALENT, ADULT OR IMMUNOSUPPRESSED PT DOSE,      ADMIN PNEUMOCOCCAL VACCINE     BP at goal today. No change in therapy. Pneumonia vaccine today. Declines flu shot at this time. Notes that she has not been sleeping well. She feels that she is \"blah\" feeling but cannot describe as depression. Sounds like she may be feeling a little depressed though given her lack of motivation and feeling like she does not want to be around people much. She does not want to take a medicine that requires a long build up to work, admits that she might not want to take it long term or everyday. Will trial trazodone to see if that will help with her sleep and moods. Will have her follow up in one month or sooner to see how that is doing. Labs today, will call with those results. Discussed BMI and healthy weight. Encouraged patient to work to implement changes including diet high in raw fruits and vegetables, lean protein and good fats. Limit refined, processed carbohydrates and sugar. Encouraged regular exercise. Verbal and written instructions (see AVS) provided. Patient expresses understanding and agreement of diagnosis and treatment plan.

## 2020-10-08 NOTE — PROGRESS NOTES
1. Have you been to the ER, urgent care clinic since your last visit? Hospitalized since your last visit? No    2. Have you seen or consulted any other health care providers outside of the [address] [place] [address] since your last visit? Include any pap smears or colon screening. No     Health Maintenance Due   Topic Date Due    Shingrix Vaccine Age 49> (1 of 2) 04/08/2002    Breast Cancer Screen Mammogram  04/08/2002    GLAUCOMA SCREENING Q2Y  04/08/2017    Bone Densitometry (Dexa) Screening  04/08/2017    Pneumococcal 65+ years (1 of 1 - PPSV23) 04/08/2017    FOBT Q1Y Age 50-75  09/27/2018    Flu Vaccine (1) 09/01/2020     Do you have an [address] in place in the event that you have a healthcare crisis that could impact your decision making as it pertains to your health? NO    Would you like information about Advance Care Planning? NO    Information given.  NO

## 2020-10-08 NOTE — PATIENT INSTRUCTIONS
Recovering From Depression: Care Instructions Your Care Instructions Taking good care of yourself is important as you recover from depression. In time, your symptoms will fade as your treatment takes hold. Do not give up. Instead, focus your energy on getting better. Your mood will improve. It just takes some time. Focus on things that can help you feel better, such as being with friends and family, eating well, and getting enough rest. But take things slowly. Do not do too much too soon. You will begin to feel better gradually. Follow-up care is a key part of your treatment and safety. Be sure to make and go to all appointments, and call your doctor if you are having problems. It's also a good idea to know your test results and keep a list of the medicines you take. How can you care for yourself at home? Be realistic · If you have a large task to do, break it up into smaller steps you can handle, and just do what you can. · You may want to put off important decisions until your depression has lifted. If you have plans that will have a major impact on your life, such as marriage, divorce, or a job change, try to wait a bit. Talk it over with friends and loved ones who can help you look at the overall picture first. 
· Reaching out to people for help is important. Do not isolate yourself. Let your family and friends help you. Find someone you can trust and confide in, and talk to that person. · Be patient, and be kind to yourself. Remember that depression is not your fault and is not something you can overcome with willpower alone. Treatment is important for depression, just like for any other illness. Feeling better takes time, and your mood will improve little by little. Stay active · Stay busy and get outside. Take a walk, or try some other light exercise. · Talk with your doctor about an exercise program. Exercise can help with mild depression. · Go to a movie or concert. Take part in a Alevism activity or other social gathering. Go to a ball game. · Ask a friend to have dinner with you. Take care of yourself · Eat a balanced diet with plenty of fresh fruits and vegetables, whole grains, and lean protein. If you have lost your appetite, eat small snacks rather than large meals. · Avoid using illegal drugs or marijuana and drinking alcohol. Do not take medicines that have not been prescribed for you. They may interfere with medicines you may be taking for depression, or they may make your depression worse. · Take your medicines exactly as they are prescribed. You may start to feel better within 1 to 3 weeks of taking antidepressant medicine. But it can take as many as 6 to 8 weeks to see more improvement. If you have questions or concerns about your medicines, or if you do not notice any improvement by 3 weeks, talk to your doctor. · Continue to take your medicine after your symptoms improve. Taking your medicine for at least 6 months after you feel better can help keep you from getting depressed again. If this isn't the first time you have been depressed, your doctor may recommend you to take medicine even longer. · If you have any side effects from your medicine, tell your doctor. Many side effects are mild and will go away on their own after you have been taking the medicine for a few weeks. Some may last longer. Talk to your doctor if side effects are bothering you too much. You might be able to try a different medicine. · Continue counseling. It may help prevent depression from returning, especially if you've had multiple episodes of depression. Talk with your counselor if you are having a hard time attending your sessions or you think the sessions aren't working. Don't just stop going. · Get enough sleep. Talk to your doctor if you are having problems sleeping.  
· Avoid sleeping pills unless they are prescribed by the doctor treating your depression. Sleeping pills may make you groggy during the day, and they may interact with other medicine you are taking. · If you have any other illnesses, such as diabetes, heart disease, or high blood pressure, make sure to continue with your treatment. Tell your doctor about all of the medicines you take, including those with or without a prescription. · If you or someone you know talks about suicide, self-harm, or feeling hopeless, get help right away. Call the [facility] at 3-761-919-QZXT ([phone]) or text HOME to 863313 to access the Crisis Text Line. Consider saving these numbers in your phone. When should you call for help? Call 911 anytime you think you may need emergency care. For example, call if: 
  · You feel like hurting yourself or someone else.  
  · Someone you know has depression and is about to attempt or is attempting suicide. Call your doctor now or seek immediate medical care if: 
  · You hear voices.  
  · Someone you know has depression and: 
? Starts to give away his or her possessions. ? Uses illegal drugs or drinks alcohol heavily. ? Talks or writes about death, including writing suicide notes or talking about guns, knives, or pills. ? Starts to spend a lot of time alone. ? Acts very aggressively or suddenly appears calm. Watch closely for changes in your health, and be sure to contact your doctor if: 
  · You do not get better as expected. Where can you learn more? Go to http://www.gray.com/ Enter M054 in the search box to learn more about \"Recovering From Depression: Care Instructions. \" Current as of: January 31, 2020               Content Version: 12.6 © 0243-9990 [employer]. Care instructions adapted under license by [employer] (which disclaims liability or warranty for this information).  If you have questions about a medical condition or this instruction, always ask your healthcare professional. [address] any warranty or liability for your use of this information. Insomnia: Care Instructions Your Care Instructions Insomnia is the inability to sleep well. It is a common problem for most people at some time. Insomnia may make it hard for you to get to sleep, stay asleep, or sleep as long as you need to. This can make you tired and grouchy during the day. It can also make you forgetful, less effective at work, and unhappy. Insomnia can be caused by conditions such as depression or anxiety. Pain can also affect your ability to sleep. When these problems are solved, the insomnia usually clears up. But sometimes bad sleep habits can cause insomnia. If insomnia is affecting your work or your enjoyment of life, you can take steps to improve your sleep. Follow-up care is a key part of your treatment and safety. Be sure to make and go to all appointments, and call your doctor if you are having problems. It's also a good idea to know your test results and keep a list of the medicines you take. How can you care for yourself at home? What to avoid · Do not have drinks with caffeine, such as coffee or black tea, for 8 hours before bed. · Do not smoke or use other types of tobacco near bedtime. Nicotine is a stimulant and can keep you awake. · Avoid drinking alcohol late in the evening, because it can cause you to wake in the middle of the night. · Do not eat a big meal close to bedtime. If you are hungry, eat a light snack. · Do not drink a lot of water close to bedtime, because the need to urinate may wake you up during the night. · Do not read or watch TV in bed. Use the bed only for sleeping and sexual activity. What to try · Go to bed at the same time every night, and wake up at the same time every morning. Do not take naps during the day. · Keep your bedroom quiet, dark, and cool. · Sleep on a comfortable pillow and mattress. · If watching the clock makes you anxious, turn it facing away from you so you cannot see the time. · If you worry when you lie down, start a worry book. Well before bedtime, write down your worries, and then set the book and your concerns aside. · Try meditation or other relaxation techniques before you go to bed. · If you cannot fall asleep, get up and go to another room until you feel sleepy. Do something relaxing. Repeat your bedtime routine before you go to bed again. · Make your house quiet and calm about an hour before bedtime. Turn down the lights, turn off the TV, log off the computer, and turn down the volume on music. This can help you relax after a busy day. When should you call for help? Watch closely for changes in your health, and be sure to contact your doctor if: 
  · Your efforts to improve your sleep do not work.  
  · Your insomnia gets worse.  
  · You have been feeling down, depressed, or hopeless or have lost interest in things that you usually enjoy. Where can you learn more? Go to http://www.gray.com/ Enter P513 in the search box to learn more about \"Insomnia: Care Instructions. \" Current as of: December 16, 2019               Content Version: 12.6 © 3922-7251 [employer]. Care instructions adapted under license by [employer] (which disclaims liability or warranty for this information). If you have questions about a medical condition or this instruction, always ask your healthcare professional. [address] any warranty or liability for your use of this information.

## 2020-10-11 LAB
25(OH)D3+25(OH)D2 SERPL-MCNC: 23 NG/ML (ref 30–100)
ALBUMIN SERPL-MCNC: 4.3 G/DL (ref 3.8–4.8)
ALBUMIN/GLOB SERPL: 2 {RATIO} (ref 1.2–2.2)
ALP SERPL-CCNC: 55 IU/L (ref 39–117)
ALT SERPL-CCNC: 17 IU/L (ref 0–32)
AST SERPL-CCNC: 20 IU/L (ref 0–40)
BASOPHILS # BLD AUTO: 0 X10E3/UL (ref 0–0.2)
BASOPHILS NFR BLD AUTO: 0 %
BILIRUB SERPL-MCNC: <0.2 MG/DL (ref 0–1.2)
BUN SERPL-MCNC: 10 MG/DL (ref 8–27)
BUN/CREAT SERPL: 12 (ref 12–28)
CALCIUM SERPL-MCNC: 9.2 MG/DL (ref 8.7–10.3)
CHLORIDE SERPL-SCNC: 102 MMOL/L (ref 96–106)
CHOLEST SERPL-MCNC: 207 MG/DL (ref 100–199)
CO2 SERPL-SCNC: 19 MMOL/L (ref 20–29)
CREAT SERPL-MCNC: 0.85 MG/DL (ref 0.57–1)
EOSINOPHIL # BLD AUTO: 0.1 X10E3/UL (ref 0–0.4)
EOSINOPHIL NFR BLD AUTO: 3 %
ERYTHROCYTE [DISTWIDTH] IN BLOOD BY AUTOMATED COUNT: 13.1 % (ref 11.7–15.4)
GLOBULIN SER CALC-MCNC: 2.1 G/DL (ref 1.5–4.5)
GLUCOSE SERPL-MCNC: 92 MG/DL (ref 65–99)
HCT VFR BLD AUTO: 45 % (ref 34–46.6)
HDLC SERPL-MCNC: 52 MG/DL
HGB BLD-MCNC: 15 G/DL (ref 11.1–15.9)
IMM GRANULOCYTES # BLD AUTO: 0 X10E3/UL (ref 0–0.1)
IMM GRANULOCYTES NFR BLD AUTO: 0 %
INTERPRETATION, 910389: NORMAL
LDLC SERPL CALC-MCNC: 135 MG/DL (ref 0–99)
LYMPHOCYTES # BLD AUTO: 1.4 X10E3/UL (ref 0.7–3.1)
LYMPHOCYTES NFR BLD AUTO: 25 %
MCH RBC QN AUTO: 32.3 PG (ref 26.6–33)
MCHC RBC AUTO-ENTMCNC: 33.3 G/DL (ref 31.5–35.7)
MCV RBC AUTO: 97 FL (ref 79–97)
MONOCYTES # BLD AUTO: 0.4 X10E3/UL (ref 0.1–0.9)
MONOCYTES NFR BLD AUTO: 8 %
NEUTROPHILS # BLD AUTO: 3.6 X10E3/UL (ref 1.4–7)
NEUTROPHILS NFR BLD AUTO: 64 %
PLATELET # BLD AUTO: 165 X10E3/UL (ref 150–450)
POTASSIUM SERPL-SCNC: 4.9 MMOL/L (ref 3.5–5.2)
PROT SERPL-MCNC: 6.4 G/DL (ref 6–8.5)
RBC # BLD AUTO: 4.65 X10E6/UL (ref 3.77–5.28)
SODIUM SERPL-SCNC: 143 MMOL/L (ref 134–144)
TRIGL SERPL-MCNC: 112 MG/DL (ref 0–149)
TSH SERPL DL<=0.005 MIU/L-ACNC: 5.45 UIU/ML (ref 0.45–4.5)
VLDLC SERPL CALC-MCNC: 20 MG/DL (ref 5–40)
WBC # BLD AUTO: 5.6 X10E3/UL (ref 3.4–10.8)

## 2020-10-13 NOTE — PROGRESS NOTES
Sent to [facility]   Your labs are back and look okay. Your vitamin d is a little low. Should be taking vitamin d3 2000 units per day. Increase water intake. Cholesterol is still up a little, keep working on diet and exercise. Your Thyroid level was slightly elevated, very slightly. Would like to recheck this in three months.        Let me know if you have any questions  Best  Lacie Mesa NP

## 2020-10-14 RX ORDER — BENZONATATE 100 MG/1
100 CAPSULE ORAL
Qty: 21 CAP | Refills: 2 | Status: SHIPPED | OUTPATIENT
Start: 2020-10-14 | End: 2021-04-07 | Stop reason: SDUPTHER

## 2020-10-14 NOTE — TELEPHONE ENCOUNTER
Requested Prescriptions     Pending Prescriptions Disp Refills    benzonatate (TESSALON) 100 mg capsule  2     Sig: Take 1 Cap by mouth three (3) times daily as needed for Cough.      Last Office Visit: [phone]

## 2020-12-14 DIAGNOSIS — J40 BRONCHITIS: ICD-10-CM

## 2020-12-14 NOTE — TELEPHONE ENCOUNTER
Flory is requesting a refill on med    Requested Prescriptions     Pending Prescriptions Disp Refills    albuterol (PROVENTIL HFA, VENTOLIN HFA, PROAIR HFA) 90 mcg/actuation inhaler 1 Inhaler 1     Sig: Take 1 Puff by inhalation every six (6) hours as needed for Wheezing for up to 360 days.

## 2020-12-15 RX ORDER — ALBUTEROL SULFATE 90 UG/1
1 AEROSOL, METERED RESPIRATORY (INHALATION)
Qty: 1 INHALER | Refills: 1 | Status: SHIPPED | OUTPATIENT
Start: 2020-12-15 | End: 2021-12-10

## 2020-12-15 RX ORDER — AMOXICILLIN AND CLAVULANATE POTASSIUM 875; 125 MG/1; MG/1
1 TABLET, FILM COATED ORAL EVERY 12 HOURS
Qty: 20 TAB | Refills: 0 | Status: SHIPPED | OUTPATIENT
Start: 2020-12-15 | End: 2022-01-31 | Stop reason: SDUPTHER

## 2020-12-15 NOTE — TELEPHONE ENCOUNTER
Flory is requesting a refill on med    Requested Prescriptions     Pending Prescriptions Disp Refills    amoxicillin-clavulanate (AUGMENTIN) 875-125 mg per tablet 20 Tab 0     Sig: Take 1 Tab by mouth every twelve (12) hours for 10 days.

## 2021-04-07 RX ORDER — BENZONATATE 100 MG/1
100 CAPSULE ORAL
Qty: 21 CAP | Refills: 2 | Status: SHIPPED | OUTPATIENT
Start: 2021-04-07 | End: 2022-02-03

## 2021-04-07 NOTE — TELEPHONE ENCOUNTER
[facility] for refill of:    Requested Prescriptions     Pending Prescriptions Disp Refills    benzonatate (TESSALON) 100 mg capsule 21 Cap 2     Sig: Take 1 Cap by mouth three (3) times daily as needed for Cough.

## 2021-08-26 RX ORDER — ERGOCALCIFEROL 1.25 MG/1
CAPSULE ORAL
Qty: 4 CAPSULE | Refills: 2 | Status: SHIPPED | OUTPATIENT
Start: 2021-08-26

## 2021-11-22 ENCOUNTER — TELEPHONE (OUTPATIENT)
Dept: FAMILY MEDICINE CLINIC | Age: 71
End: 2021-11-22

## 2021-11-22 RX ORDER — DICLOFENAC POTASSIUM 50 MG/1
50 TABLET, FILM COATED ORAL 3 TIMES DAILY
Qty: 90 TABLET | Refills: 1 | Status: SHIPPED | OUTPATIENT
Start: 2021-11-22

## 2021-11-22 NOTE — TELEPHONE ENCOUNTER
----- Message from [address] [place] [address]. Grabiel sent at 11/22/2021  9:28 AM EST -----  Regarding: Fredrick Marcum - prescription request  can you please call me in a prescription for DICLOFENAC  50MG - 2 TIMES A DAY? About 4 yrs ago i had 23cc of fluid removed from my knee and no problems since until yesterday. Then my knee is bothering me but i had a few of these pills left and found that they still help. I had about 4 refills of it and now only have 2 pills left. I really do not want to go back to that dr at this time.

## 2022-01-29 ENCOUNTER — PATIENT MESSAGE (OUTPATIENT)
Dept: FAMILY MEDICINE CLINIC | Age: 72
End: 2022-01-29

## 2022-01-31 RX ORDER — AMOXICILLIN AND CLAVULANATE POTASSIUM 875; 125 MG/1; MG/1
1 TABLET, FILM COATED ORAL EVERY 12 HOURS
Qty: 20 TABLET | Refills: 0 | Status: SHIPPED | OUTPATIENT
Start: 2022-01-31 | End: 2022-02-10

## 2022-01-31 NOTE — TELEPHONE ENCOUNTER
From: Carlos A Garcia  To: Olga Cruz NP  Sent: 1/29/2022 10:18 AM EST  Subject: Aleshai Warners - Augmentin 571    ICZIA, hope you and everyone are doing well. We are fine. Just got over a cold but mending well. I have not refilled my Augmentin 875 since 12/20 and would appreciate a refill or two of it, since we have shown i do not abuse it but it sure comes in handy when i need it. Can you please do that for me? Thanks in advance.

## 2023-05-10 RX ORDER — ERGOCALCIFEROL 1.25 MG/1
CAPSULE ORAL
COMMUNITY
Start: 2021-08-26

## 2023-05-10 RX ORDER — DICLOFENAC POTASSIUM 50 MG/1
TABLET, FILM COATED ORAL 3 TIMES DAILY
COMMUNITY
Start: 2021-11-22

## 2023-05-10 RX ORDER — TRAZODONE HYDROCHLORIDE 50 MG/1
TABLET ORAL
COMMUNITY
Start: 2020-10-08

## 2023-05-10 RX ORDER — BENZONATATE 100 MG/1
CAPSULE ORAL
COMMUNITY
Start: 2022-02-03

## 2023-05-10 RX ORDER — CLOBETASOL PROPIONATE 0.5 MG/G
OINTMENT TOPICAL 2 TIMES DAILY
COMMUNITY
Start: 2019-05-16

## 2023-05-10 RX ORDER — HYDROCHLOROTHIAZIDE 25 MG/1
1 TABLET ORAL DAILY
COMMUNITY
Start: 2020-06-08